# Patient Record
Sex: MALE | Race: WHITE | Employment: OTHER | URBAN - METROPOLITAN AREA
[De-identification: names, ages, dates, MRNs, and addresses within clinical notes are randomized per-mention and may not be internally consistent; named-entity substitution may affect disease eponyms.]

---

## 2020-09-10 PROCEDURE — 99285 EMERGENCY DEPT VISIT HI MDM: CPT

## 2020-09-10 PROCEDURE — 96365 THER/PROPH/DIAG IV INF INIT: CPT

## 2020-09-10 ASSESSMENT — PAIN SCALES - GENERAL: PAINLEVEL_OUTOF10: 8

## 2020-09-10 ASSESSMENT — PAIN DESCRIPTION - FREQUENCY: FREQUENCY: INTERMITTENT

## 2020-09-11 ENCOUNTER — APPOINTMENT (OUTPATIENT)
Dept: GENERAL RADIOLOGY | Age: 35
DRG: 854 | End: 2020-09-11

## 2020-09-11 ENCOUNTER — HOSPITAL ENCOUNTER (INPATIENT)
Age: 35
LOS: 2 days | Discharge: HOME | DRG: 854 | End: 2020-09-13
Attending: EMERGENCY MEDICINE | Admitting: INTERNAL MEDICINE

## 2020-09-11 PROBLEM — Z72.0 TOBACCO ABUSE: Status: ACTIVE | Noted: 2020-09-11

## 2020-09-11 PROBLEM — F11.10 HEROIN ABUSE (HCC): Status: ACTIVE | Noted: 2020-09-11

## 2020-09-11 PROBLEM — L03.90 CELLULITIS: Status: ACTIVE | Noted: 2020-09-11

## 2020-09-11 LAB
ALBUMIN SERPL-MCNC: 3.7 G/DL (ref 3.5–5.2)
ALP BLD-CCNC: 119 U/L (ref 40–129)
ALT SERPL-CCNC: 23 U/L (ref 0–40)
ANION GAP SERPL CALCULATED.3IONS-SCNC: 9 MMOL/L (ref 7–16)
AST SERPL-CCNC: 22 U/L (ref 0–39)
BASOPHILS ABSOLUTE: 0.06 E9/L (ref 0–0.2)
BASOPHILS RELATIVE PERCENT: 0.4 % (ref 0–2)
BILIRUB SERPL-MCNC: 0.4 MG/DL (ref 0–1.2)
BILIRUBIN URINE: NEGATIVE
BLOOD, URINE: NEGATIVE
BUN BLDV-MCNC: 8 MG/DL (ref 6–20)
CALCIUM SERPL-MCNC: 9.2 MG/DL (ref 8.6–10.2)
CHLORIDE BLD-SCNC: 97 MMOL/L (ref 98–107)
CLARITY: CLEAR
CO2: 27 MMOL/L (ref 22–29)
COLOR: YELLOW
CREAT SERPL-MCNC: 0.8 MG/DL (ref 0.7–1.2)
EKG ATRIAL RATE: 94 BPM
EKG P AXIS: 68 DEGREES
EKG P-R INTERVAL: 132 MS
EKG Q-T INTERVAL: 354 MS
EKG QRS DURATION: 96 MS
EKG QTC CALCULATION (BAZETT): 442 MS
EKG R AXIS: 91 DEGREES
EKG T AXIS: 60 DEGREES
EKG VENTRICULAR RATE: 94 BPM
EOSINOPHILS ABSOLUTE: 0.01 E9/L (ref 0.05–0.5)
EOSINOPHILS RELATIVE PERCENT: 0.1 % (ref 0–6)
GFR AFRICAN AMERICAN: >60
GFR NON-AFRICAN AMERICAN: >60 ML/MIN/1.73
GLUCOSE BLD-MCNC: 114 MG/DL (ref 74–99)
GLUCOSE URINE: NEGATIVE MG/DL
HCT VFR BLD CALC: 33.8 % (ref 37–54)
HEMOGLOBIN: 10.9 G/DL (ref 12.5–16.5)
HIV-1 AND HIV-2 ANTIBODIES: NORMAL
IMMATURE GRANULOCYTES #: 0.28 E9/L
IMMATURE GRANULOCYTES %: 1.8 % (ref 0–5)
KETONES, URINE: NEGATIVE MG/DL
LACTIC ACID, SEPSIS: 1.2 MMOL/L (ref 0.5–1.9)
LEUKOCYTE ESTERASE, URINE: NEGATIVE
LYMPHOCYTES ABSOLUTE: 1.03 E9/L (ref 1.5–4)
LYMPHOCYTES RELATIVE PERCENT: 6.6 % (ref 20–42)
MCH RBC QN AUTO: 24.9 PG (ref 26–35)
MCHC RBC AUTO-ENTMCNC: 32.2 % (ref 32–34.5)
MCV RBC AUTO: 77.3 FL (ref 80–99.9)
MONOCYTES ABSOLUTE: 1.1 E9/L (ref 0.1–0.95)
MONOCYTES RELATIVE PERCENT: 7 % (ref 2–12)
NEUTROPHILS ABSOLUTE: 13.21 E9/L (ref 1.8–7.3)
NEUTROPHILS RELATIVE PERCENT: 84.1 % (ref 43–80)
NITRITE, URINE: NEGATIVE
PDW BLD-RTO: 14.7 FL (ref 11.5–15)
PH UA: 5.5 (ref 5–9)
PLATELET # BLD: 412 E9/L (ref 130–450)
PMV BLD AUTO: 9.7 FL (ref 7–12)
POTASSIUM SERPL-SCNC: 3.3 MMOL/L (ref 3.5–5)
PRO-BNP: 415 PG/ML (ref 0–125)
PROTEIN UA: NEGATIVE MG/DL
RBC # BLD: 4.37 E12/L (ref 3.8–5.8)
SARS-COV-2, NAAT: NOT DETECTED
SODIUM BLD-SCNC: 133 MMOL/L (ref 132–146)
SPECIFIC GRAVITY UA: 1.02 (ref 1–1.03)
TOTAL CK: 61 U/L (ref 20–200)
TOTAL PROTEIN: 7.1 G/DL (ref 6.4–8.3)
TROPONIN: <0.01 NG/ML (ref 0–0.03)
UROBILINOGEN, URINE: 0.2 E.U./DL
WBC # BLD: 15.7 E9/L (ref 4.5–11.5)

## 2020-09-11 PROCEDURE — 85025 COMPLETE CBC W/AUTO DIFF WBC: CPT

## 2020-09-11 PROCEDURE — 93010 ELECTROCARDIOGRAM REPORT: CPT | Performed by: INTERNAL MEDICINE

## 2020-09-11 PROCEDURE — 86703 HIV-1/HIV-2 1 RESULT ANTBDY: CPT

## 2020-09-11 PROCEDURE — 6370000000 HC RX 637 (ALT 250 FOR IP): Performed by: EMERGENCY MEDICINE

## 2020-09-11 PROCEDURE — 2580000003 HC RX 258: Performed by: INTERNAL MEDICINE

## 2020-09-11 PROCEDURE — 6360000002 HC RX W HCPCS: Performed by: INTERNAL MEDICINE

## 2020-09-11 PROCEDURE — 84484 ASSAY OF TROPONIN QUANT: CPT

## 2020-09-11 PROCEDURE — 2580000003 HC RX 258: Performed by: NURSE PRACTITIONER

## 2020-09-11 PROCEDURE — 71045 X-RAY EXAM CHEST 1 VIEW: CPT

## 2020-09-11 PROCEDURE — U0002 COVID-19 LAB TEST NON-CDC: HCPCS

## 2020-09-11 PROCEDURE — 1200000000 HC SEMI PRIVATE

## 2020-09-11 PROCEDURE — 83880 ASSAY OF NATRIURETIC PEPTIDE: CPT

## 2020-09-11 PROCEDURE — 6370000000 HC RX 637 (ALT 250 FOR IP): Performed by: NURSE PRACTITIONER

## 2020-09-11 PROCEDURE — 6370000000 HC RX 637 (ALT 250 FOR IP): Performed by: INTERNAL MEDICINE

## 2020-09-11 PROCEDURE — 83605 ASSAY OF LACTIC ACID: CPT

## 2020-09-11 PROCEDURE — 87070 CULTURE OTHR SPECIMN AEROBIC: CPT

## 2020-09-11 PROCEDURE — 80053 COMPREHEN METABOLIC PANEL: CPT

## 2020-09-11 PROCEDURE — 36415 COLL VENOUS BLD VENIPUNCTURE: CPT

## 2020-09-11 PROCEDURE — 6360000002 HC RX W HCPCS: Performed by: NURSE PRACTITIONER

## 2020-09-11 PROCEDURE — 73590 X-RAY EXAM OF LOWER LEG: CPT

## 2020-09-11 PROCEDURE — 82550 ASSAY OF CK (CPK): CPT

## 2020-09-11 PROCEDURE — 87088 URINE BACTERIA CULTURE: CPT

## 2020-09-11 PROCEDURE — 99223 1ST HOSP IP/OBS HIGH 75: CPT | Performed by: INTERNAL MEDICINE

## 2020-09-11 PROCEDURE — 87040 BLOOD CULTURE FOR BACTERIA: CPT

## 2020-09-11 PROCEDURE — 81003 URINALYSIS AUTO W/O SCOPE: CPT

## 2020-09-11 PROCEDURE — 6360000002 HC RX W HCPCS: Performed by: EMERGENCY MEDICINE

## 2020-09-11 PROCEDURE — 93005 ELECTROCARDIOGRAM TRACING: CPT | Performed by: NURSE PRACTITIONER

## 2020-09-11 RX ORDER — SODIUM CHLORIDE 0.9 % (FLUSH) 0.9 %
10 SYRINGE (ML) INJECTION EVERY 12 HOURS SCHEDULED
Status: DISCONTINUED | OUTPATIENT
Start: 2020-09-11 | End: 2020-09-14 | Stop reason: HOSPADM

## 2020-09-11 RX ORDER — ACETAMINOPHEN 500 MG
1000 TABLET ORAL ONCE
Status: COMPLETED | OUTPATIENT
Start: 2020-09-11 | End: 2020-09-11

## 2020-09-11 RX ORDER — PROMETHAZINE HYDROCHLORIDE 25 MG/1
12.5 TABLET ORAL EVERY 6 HOURS PRN
Status: DISCONTINUED | OUTPATIENT
Start: 2020-09-11 | End: 2020-09-14 | Stop reason: HOSPADM

## 2020-09-11 RX ORDER — POLYETHYLENE GLYCOL 3350 17 G
2 POWDER IN PACKET (EA) ORAL
Status: DISCONTINUED | OUTPATIENT
Start: 2020-09-11 | End: 2020-09-14 | Stop reason: HOSPADM

## 2020-09-11 RX ORDER — OXYCODONE HYDROCHLORIDE 5 MG/1
5 TABLET ORAL EVERY 4 HOURS PRN
Status: DISCONTINUED | OUTPATIENT
Start: 2020-09-11 | End: 2020-09-11

## 2020-09-11 RX ORDER — SODIUM CHLORIDE 9 MG/ML
INJECTION, SOLUTION INTRAVENOUS CONTINUOUS
Status: DISCONTINUED | OUTPATIENT
Start: 2020-09-11 | End: 2020-09-14 | Stop reason: HOSPADM

## 2020-09-11 RX ORDER — OXYCODONE HYDROCHLORIDE 5 MG/1
5 TABLET ORAL EVERY 4 HOURS PRN
Status: DISCONTINUED | OUTPATIENT
Start: 2020-09-11 | End: 2020-09-14 | Stop reason: HOSPADM

## 2020-09-11 RX ORDER — ACETAMINOPHEN 325 MG/1
650 TABLET ORAL EVERY 6 HOURS PRN
Status: DISCONTINUED | OUTPATIENT
Start: 2020-09-11 | End: 2020-09-14 | Stop reason: HOSPADM

## 2020-09-11 RX ORDER — SODIUM CHLORIDE 0.9 % (FLUSH) 0.9 %
10 SYRINGE (ML) INJECTION PRN
Status: DISCONTINUED | OUTPATIENT
Start: 2020-09-11 | End: 2020-09-14 | Stop reason: HOSPADM

## 2020-09-11 RX ORDER — KETOROLAC TROMETHAMINE 30 MG/ML
30 INJECTION, SOLUTION INTRAMUSCULAR; INTRAVENOUS EVERY 6 HOURS PRN
Status: DISCONTINUED | OUTPATIENT
Start: 2020-09-11 | End: 2020-09-14 | Stop reason: HOSPADM

## 2020-09-11 RX ORDER — POTASSIUM CHLORIDE 20 MEQ/1
40 TABLET, EXTENDED RELEASE ORAL ONCE
Status: COMPLETED | OUTPATIENT
Start: 2020-09-11 | End: 2020-09-11

## 2020-09-11 RX ORDER — 0.9 % SODIUM CHLORIDE 0.9 %
2100 INTRAVENOUS SOLUTION INTRAVENOUS ONCE
Status: COMPLETED | OUTPATIENT
Start: 2020-09-11 | End: 2020-09-11

## 2020-09-11 RX ORDER — ONDANSETRON 2 MG/ML
4 INJECTION INTRAMUSCULAR; INTRAVENOUS EVERY 6 HOURS PRN
Status: DISCONTINUED | OUTPATIENT
Start: 2020-09-11 | End: 2020-09-14 | Stop reason: HOSPADM

## 2020-09-11 RX ORDER — KETOROLAC TROMETHAMINE 30 MG/ML
15 INJECTION, SOLUTION INTRAMUSCULAR; INTRAVENOUS ONCE
Status: COMPLETED | OUTPATIENT
Start: 2020-09-11 | End: 2020-09-11

## 2020-09-11 RX ORDER — POLYETHYLENE GLYCOL 3350 17 G/17G
17 POWDER, FOR SOLUTION ORAL DAILY PRN
Status: DISCONTINUED | OUTPATIENT
Start: 2020-09-11 | End: 2020-09-14 | Stop reason: HOSPADM

## 2020-09-11 RX ORDER — ACETAMINOPHEN 650 MG/1
650 SUPPOSITORY RECTAL EVERY 6 HOURS PRN
Status: DISCONTINUED | OUTPATIENT
Start: 2020-09-11 | End: 2020-09-14 | Stop reason: HOSPADM

## 2020-09-11 RX ADMIN — Medication 10 ML: at 14:52

## 2020-09-11 RX ADMIN — ACETAMINOPHEN 650 MG: 325 TABLET ORAL at 09:57

## 2020-09-11 RX ADMIN — VANCOMYCIN HYDROCHLORIDE 1000 MG: 1 INJECTION, POWDER, LYOPHILIZED, FOR SOLUTION INTRAVENOUS at 03:45

## 2020-09-11 RX ADMIN — SODIUM CHLORIDE 75 ML/HR: 9 INJECTION, SOLUTION INTRAVENOUS at 15:27

## 2020-09-11 RX ADMIN — ACETAMINOPHEN 1000 MG: 500 TABLET ORAL at 00:58

## 2020-09-11 RX ADMIN — CEFEPIME 2 G: 2 INJECTION, POWDER, FOR SOLUTION INTRAMUSCULAR; INTRAVENOUS at 22:04

## 2020-09-11 RX ADMIN — CEFEPIME 2 G: 2 INJECTION, POWDER, FOR SOLUTION INTRAMUSCULAR; INTRAVENOUS at 15:28

## 2020-09-11 RX ADMIN — ACETAMINOPHEN 650 MG: 325 TABLET ORAL at 16:08

## 2020-09-11 RX ADMIN — VANCOMYCIN HYDROCHLORIDE 1000 MG: 1 INJECTION, POWDER, LYOPHILIZED, FOR SOLUTION INTRAVENOUS at 12:42

## 2020-09-11 RX ADMIN — ACETAMINOPHEN 650 MG: 325 TABLET ORAL at 22:04

## 2020-09-11 RX ADMIN — OXYCODONE HYDROCHLORIDE 5 MG: 5 TABLET ORAL at 22:05

## 2020-09-11 RX ADMIN — KETOROLAC TROMETHAMINE 15 MG: 30 INJECTION, SOLUTION INTRAMUSCULAR at 03:44

## 2020-09-11 RX ADMIN — SODIUM CHLORIDE: 9 INJECTION, SOLUTION INTRAVENOUS at 05:46

## 2020-09-11 RX ADMIN — SODIUM CHLORIDE, PRESERVATIVE FREE 10 ML: 5 INJECTION INTRAVENOUS at 20:09

## 2020-09-11 RX ADMIN — POTASSIUM CHLORIDE 40 MEQ: 20 TABLET, EXTENDED RELEASE ORAL at 03:44

## 2020-09-11 RX ADMIN — VANCOMYCIN HYDROCHLORIDE 1000 MG: 1 INJECTION, POWDER, LYOPHILIZED, FOR SOLUTION INTRAVENOUS at 20:09

## 2020-09-11 RX ADMIN — OXYCODONE HYDROCHLORIDE 5 MG: 5 TABLET ORAL at 09:57

## 2020-09-11 RX ADMIN — PIPERACILLIN SODIUM AND TAZOBACTAM SODIUM 4.5 G: 4; .5 INJECTION, POWDER, LYOPHILIZED, FOR SOLUTION INTRAVENOUS at 01:40

## 2020-09-11 RX ADMIN — SODIUM CHLORIDE 2100 ML: 9 INJECTION, SOLUTION INTRAVENOUS at 01:39

## 2020-09-11 RX ADMIN — OXYCODONE HYDROCHLORIDE 5 MG: 5 TABLET ORAL at 15:38

## 2020-09-11 ASSESSMENT — PAIN SCALES - GENERAL
PAINLEVEL_OUTOF10: 9
PAINLEVEL_OUTOF10: 0
PAINLEVEL_OUTOF10: 6
PAINLEVEL_OUTOF10: 0
PAINLEVEL_OUTOF10: 7
PAINLEVEL_OUTOF10: 0
PAINLEVEL_OUTOF10: 8
PAINLEVEL_OUTOF10: 8
PAINLEVEL_OUTOF10: 7
PAINLEVEL_OUTOF10: 9
PAINLEVEL_OUTOF10: 9

## 2020-09-11 ASSESSMENT — PAIN DESCRIPTION - ONSET
ONSET: ON-GOING
ONSET: ON-GOING

## 2020-09-11 ASSESSMENT — PAIN DESCRIPTION - DESCRIPTORS
DESCRIPTORS: ACHING;DISCOMFORT;CONSTANT
DESCRIPTORS: ACHING;DISCOMFORT;CONSTANT
DESCRIPTORS: BURNING;DISCOMFORT;DULL

## 2020-09-11 ASSESSMENT — PAIN DESCRIPTION - LOCATION
LOCATION: GENERALIZED
LOCATION: GENERALIZED

## 2020-09-11 ASSESSMENT — PAIN DESCRIPTION - FREQUENCY
FREQUENCY: CONTINUOUS

## 2020-09-11 ASSESSMENT — PAIN DESCRIPTION - PROGRESSION
CLINICAL_PROGRESSION: NOT CHANGED

## 2020-09-11 ASSESSMENT — PAIN DESCRIPTION - PAIN TYPE
TYPE: ACUTE PAIN

## 2020-09-11 ASSESSMENT — PAIN DESCRIPTION - ORIENTATION: ORIENTATION: RIGHT

## 2020-09-11 ASSESSMENT — PAIN - FUNCTIONAL ASSESSMENT
PAIN_FUNCTIONAL_ASSESSMENT: PREVENTS OR INTERFERES SOME ACTIVE ACTIVITIES AND ADLS
PAIN_FUNCTIONAL_ASSESSMENT: PREVENTS OR INTERFERES SOME ACTIVE ACTIVITIES AND ADLS

## 2020-09-11 NOTE — PROGRESS NOTES
Pharmacy Consultation Note  (Antibiotic Dosing and Monitoring)    Initial consult date: 2020  Consulting physician: Dr. Glory Tran  Drug: Vancomycin  Indication: Cellulitis    Age/Gender Height Weight IBW Dosing weight  Allergy Information   35 y.o./male 6' 1\" (185.4 cm) 150 lb (68 kg)     Ideal body weight: 79.9 kg (176 lb 2.4 oz)  150 lb (68 kg)  Patient has no known allergies. Temp (24hrs), Av °F (38.3 °C), Min:98.7 °F (37.1 °C), Max:103.1 °F (39.5 °C)          Date  WBC BUN/SCr Drug/Dose Time   Given Level(s)   (Time) Comments    15.7 8/0.8 Vancomycin 1 g IV Q8H  0345  1242                                        Intake/Output Summary (Last 24 hours) at 2020 1313  Last data filed at 2020 0826  Gross per 24 hour   Intake 2200 ml   Output --   Net 2200 ml       Historical Cultures:  No results found for: ORG  No results for input(s): BC in the last 72 hours.     Cultures:  available culture and sensitivity results were reviewed in EPIC   blood cx - pending   urine cx - pending   wound cx - pending    Assessment:  · Patient is a 28year old male currently ordered vancomycin and cefepime for cellulitis  · Estimated CrCl = >120 mL/min  · Goal trough level = 15-20 mcg/mL; goal AUC/LIZZETTE >400    Plan:  · Will initiate vancomycin at a dose of 1000 mg IV every 8 hours  · Will check trough level at steady state if vancomycin continues  · Clinical pharmacy to follow    Trenton Vallejo, PharmD, BCCCP  2020  1:17 PM

## 2020-09-11 NOTE — CARE COORDINATION
Social Work / Discharge Planning : SW and CM met with patient and explained role as discharge planner/ transition of care., Patient amditted with Cellulitis. Patient is IV heroin drug user. Patient does NOT have insurance nor PCP. SW added pre-service information to avs.HHC is NOT an option at discharge due to IV drug user, no PCP and No insurance. Patient very forthcoming about his 8 years of shooting up Heroin. Patient in agreement for HELP with his addiction. Referral called to Shay at [facility] and she will see patient. Patient best bet at discharge is [facility] for IV antibiotics IF needed and they have a drug program. Once patient gets medically stable, he will need intense in patient rehab to deal with his long years of addiction. Patient does have family support from both parents. Await screening from [facility] financial department so patient can apply for insurance. AWait ID plan. SW to follow.  Electronically signed by BUSHRA Fuller on 9/11/20 at 12:32 PM EDT

## 2020-09-11 NOTE — FLOWSHEET NOTE
Initial Inpatient Wound Care    Admit Date: 9/11/2020 12:27 AM    Reason for consult:     wounds generalized large wound on thigh        Significant history:  IV heroin use  Adm with fever and leg pain    Wound history: POA    Findings: awake and verbally appropriate    09/11/20 1040   Wound 09/11/20 Thigh Anterior;Right   Date First Assessed/Time First Assessed: 09/11/20 1040   Present on Hospital Admission: Yes  Location: Thigh  Wound Location Orientation: Anterior;Right   Wound Image    Dressing Changed Changed/New   Dressing/Treatment   (adaptic, opticell. stratasorb)   Dressing Change Due 09/12/20   Wound Length (cm) 6 cm   Wound Width (cm) 4 cm   Wound Depth (cm)   (obs)   Wound Surface Area (cm^2) 24 cm^2   Wound Assessment Black  (purple, mushy)   Drainage Amount Small   Drainage Description   (red)   Odor Strong   Ramila-wound Assessment Induration; Red   bilateral legs with multiple scarring, track marks, medial left ankle. Dried skin approx 3x2 each. Not open  Hands and fingers edematous, small scabs, multiple scarring          Plan: ID to see  Dressing changes, continue  Morris Sommers.  STEPHIE Kaur [place] 9/11/2020 10:43 AM

## 2020-09-11 NOTE — ED PROVIDER NOTES
ED Attending  CC: Yes       Department of Emergency Medicine   ED  Provider Note  Admit Date/RoomTime: 9/11/2020 12:27 AM  ED Room: 25/25  MRN: [id number]  Chief Complaint:   Nausea; Chills; and Leg Swelling       History of Present Illness   Source of history provided by:  patient and parent. History/Exam Limitations: none. Kisha Watts is a 28 y.o. male who has a past medical history of: No past medical history on file. presents to the ED by private vehicle for fever, chills, nausea, and edema of all extremities, beginning 3 days ago and are gradually worsening since that time. The complaint has been persistent, moderate in severity. Patient states he uses IV heroin regularly. His right lower extremity is warm and red. States he is having occasional nonproductive cough. States nothing makes his symptoms better or worse. He denies headache, sore throat, chest pain, shortness of breath, abdominal pain, vomiting, diarrhea, dysuria. ROS   Pertinent positives and negatives are stated within HPI, all other systems reviewed and are negative. No past surgical history on file. Social History:  reports that he has been smoking. He has been smoking about 1.00 pack per day. He has never used smokeless tobacco. He reports current drug use. Family History: family history is not on file. Allergies: Patient has no known allergies. Physical Exam Section   Oxygen Saturation Interpretation: Normal.   ED Triage Vitals [09/10/20 2334]   BP Temp Temp Source Pulse Resp SpO2 Height Weight   (!) 118/56 103.1 °F (39.5 °C) Infrared 110 16 91 % 6' 1\" (1.854 m) 150 lb (68 kg)       Physical Exam  · Constitutional/General: Alert and oriented x3, shivering and ill-appearing. · HEENT:  NC/NT. PERRLA,  Airway patent. · Neck: Supple, full ROM, non tender to palpation in the midline, no stridor, no crepitus, no meningeal signs  · Respiratory: Lungs clear to auscultation bilaterally, no wheezes, rales, or rhonchi.  Not in respiratory distress  · CV: Tachycardic rate. Regular rhythm. No murmurs, gallops, or rubs. 2+ distal pulses  · Chest: No chest wall tenderness  · GI:  Abdomen Soft, Non tender, Non distended. +BS. No rebound, guarding, or rigidity. No pulsatile masses. · Musculoskeletal: Moves all extremities x 4. Warm and well perfused, no clubbing, cyanosis. Capillary refill <3 seconds. 1+ nonpitting edema to both hands and feet. 1+ nonpitting edema to the right lower extremity with mild erythema. Tenderness more significant in the anterior right lower extremity than the calf. · Integument: skin warm and dry. No rashes. Generalized scattered healed injection site scars.   · Lymphatic: no lymphadenopathy noted  · Neurologic: GCS 15, no focal deficits, symmetric strength 5/5 in the upper and lower extremities bilaterally  · Psychiatric: Normal Affect      Lab / Imaging Results   (All laboratory and radiology results have been personally reviewed by myself)  Labs:  Results for orders placed or performed during the hospital encounter of 09/11/20   Lactate, Sepsis   Result Value Ref Range    Lactic Acid, Sepsis 1.2 0.5 - 1.9 mmol/L   CBC Auto Differential   Result Value Ref Range    WBC 15.7 (H) 4.5 - 11.5 E9/L    RBC 4.37 3.80 - 5.80 E12/L    Hemoglobin 10.9 (L) 12.5 - 16.5 g/dL    Hematocrit 33.8 (L) 37.0 - 54.0 %    MCV 77.3 (L) 80.0 - 99.9 fL    MCH 24.9 (L) 26.0 - 35.0 pg    MCHC 32.2 32.0 - 34.5 %    RDW 14.7 11.5 - 15.0 fL    Platelets 788 642 - 914 E9/L    MPV 9.7 7.0 - 12.0 fL    Neutrophils % 84.1 (H) 43.0 - 80.0 %    Immature Granulocytes % 1.8 0.0 - 5.0 %    Lymphocytes % 6.6 (L) 20.0 - 42.0 %    Monocytes % 7.0 2.0 - 12.0 %    Eosinophils % 0.1 0.0 - 6.0 %    Basophils % 0.4 0.0 - 2.0 %    Neutrophils Absolute 13.21 (H) 1.80 - 7.30 E9/L    Immature Granulocytes # 0.28 E9/L    Lymphocytes Absolute 1.03 (L) 1.50 - 4.00 E9/L    Monocytes Absolute 1.10 (H) 0.10 - 0.95 E9/L    Eosinophils Absolute 0.01 (L) 0.05 - be related to soft tissue cellulitis. Negative for    soft tissue gas. 2. Negative for acute fracture, dislocation. This report has been electronically signed by Casi Smith MD.      XR CHEST PORTABLE   Final Result      No airspace opacities or pleural effusion. EKG #1:  Interpreted by emergency department physician unless otherwise noted. Time:  0248    Rate: 94  Rhythm: Sinus. Interpretation: normal sinus rhythm, incomplete right bundle branch block, there are no previous tracings available for comparison. ED Course / Medical Decision Making     Medications   sodium chloride flush 0.9 % injection 10 mL (has no administration in time range)   sodium chloride flush 0.9 % injection 10 mL (has no administration in time range)   vancomycin 1000 mg IVPB in 250 mL D5W addavial (has no administration in time range)   potassium chloride (KLOR-CON M) extended release tablet 40 mEq (has no administration in time range)   ketorolac (TORADOL) injection 15 mg (has no administration in time range)   acetaminophen (TYLENOL) tablet 1,000 mg (1,000 mg Oral Given 9/11/20 0058)   piperacillin-tazobactam (ZOSYN) 4.5 g in dextrose 5 % 100 mL IVPB (mini-bag) (0 g Intravenous Stopped 9/11/20 0215)   0.9 % sodium chloride bolus (2,100 mLs Intravenous New Bag 9/11/20 0139)        Re-Evaluations:  9/11/20     0200: Patient resting in no distress. Time: 0300    Patients symptoms are unchanged. No distress. Consultations:             61 51 81: Dr. Janelle Lozano spoke with Dr. Caleb Stevens and the patient will be admitted to a monitored bed for further treatment and observation. Procedures:   none    MDM: Patient presented with admitted IV drug use, edema of his limbs and clinical appearance of cellulitis to the right lower extremity. He meets sepsis criteria. IV antibiotics and cultures were obtained. He remained stable while in emergency department.   Internal medicine was consulted and the patient will be admitted to a monitored bed. Counseling:   I have spoken with the patient and discussed todays results, in addition to providing specific details for the plan of care and counseling regarding the diagnosis and prognosis and are agreeable with the plan. Assessment      1. Cellulitis of right leg New Problem   2. Septicemia ([address] New Problem     This patient's ED course included: a personal history and physicial examination  This patient has remained hemodynamically stable during their ED course. Plan   Admit to telemetry. Patient condition is stable. New Medications     New Prescriptions    No medications on file     Electronically signed by AL Coe CNP   DD: 9/11/20  **This report was transcribed using voice recognition software. Every effort was made to ensure accuracy; however, inadvertent computerized transcription errors may be present. END OF PROVIDER NOTE    CRITICAL CARE:   45 MINUTES. Please note that the withdrawal or failure to initiate urgent interventions for this patient would likely result in a life threatening deterioration or permanent disability. Accordingly this patient received the above mentioned time, excluding separately billable procedures.               AL Napier CNP  09/11/20 0308

## 2020-09-11 NOTE — PROGRESS NOTES
[facility] Quality Flow/Interdisciplinary Rounds Progress Note        Quality Flow Rounds held on September 11, 2020    Disciplines Attending:  Bedside Nurse, ,  and Nursing Unit Leadership    James Lovett was admitted on 9/11/2020 12:27 AM    Anticipated Discharge Date:  Expected Discharge Date: 09/14/20    Disposition:    Jacob Score:  Jacob Scale Score: 19    Readmission Risk              Risk of Unplanned Readmission:        9           Discussed patient goal for the day, patient clinical progression, and barriers to discharge. The following Goal(s) of the Day/Commitment(s) have been identified:  IV antibiotics.        Edwina Livingston  September 11, 2020

## 2020-09-11 NOTE — PROGRESS NOTES
Pharmacy Consultation Note  (Antibiotic Dosing and Monitoring)    Initial consult date: 2020  Consulting physician: Jerod Lindsey  Drug: Vancomycin  Indication: Cellulitis    Age/  Gender Height Weight IBW Dosing weight  Allergy Information   35 y.o./male 6' 1\" (185.4 cm) 150 lb (68 kg)     Ideal body weight: 79.9 kg (176 lb 2.4 oz)  150 lb (68 kg)  Patient has no known allergies. Temp (24hrs), Av.2 °F (38.4 °C), Min:98.7 °F (37.1 °C), Max:103.1 °F (39.5 °C)          Date  WBC BUN SCr CrCl  (mL/min) Drug/Dose Time   Given Level(s)   (Time) Comments   2020 15.7 8 0.8  124   Vancomycin 1000 mg IV  0345                                            Intake/Output Summary (Last 24 hours) at 2020 0526  Last data filed at 2020 0348  Gross per 24 hour   Intake 2200 ml   Output --   Net 2200 ml       Historical Cultures:  No results found for: ORG  No results for input(s): BC in the last 72 hours. Cultures:  available culture and sensitivity results were reviewed in [facility]    Assessment:  · 28 y.o. male has been initiated Vancomycin.   · Estimated CrCl = 124 mL/min  · Goal trough level = 15-20 mcg/mL    Plan:  · Will initiate vancomycin at a dose of 1000 mg IV every 8 hours  · Monitor renal function   · Clinical pharmacy to follow      Marissa Bain [address] [place] [address] 2020 5:26 AM

## 2020-09-11 NOTE — PLAN OF CARE
Patient sitting up in bed eating breakfast tray. Pain better control. Patient stated he is feeling better.

## 2020-09-11 NOTE — H&P
[facility] History and Physical    Perpetual assessment: 68-year-old male with past medical history of IV drug abuse presents with fevers and right lower leg pain. Right leg cellulitis  -Secondary from continues heroin use  -COVID-19 was negative  -X-ray was negative for any acute fractures or soft tissue gas  -We will continue IV vancomycin started in ED  -Await blood cultures results rule out endocarditis  -Of note did not have any murmurs  -Monitor white blood cell count for response    Heroin abuse  -Did you yesterday  -We will provide oral oxycodone to assist him most likely withdrawal  -The patient is requesting HIV test we will proceed    Tobacco abuse  -Encourage smoking cessation    CODE STATUS: Full  DVT prophylaxis: Lovenox      CHIEF COMPLAINT: Fevers and right leg pain    History of Present Illness: This is an unfortunate 68-year-old male who presents to [facility] the above-stated complaint. All history has been taken for the patient and review of medical records. He has been using heroin regularly for quite some years. Over the past few days has been having fevers and worsening right leg swelling. He has also bilateral right hand swelling which is been chronic for him. Due to right leg swelling for which he came to ED for evaluation. There he was found to have a white count of 15,700. Concern for systemic infection was raised. X-rays show signs of cellulitis but no gas. Blood cultures were obtained. Due to his history of IV drug abuse he was admitted for IV antibiotics. Informant(s) for H&P:    REVIEW OF SYSTEMS:  A comprehensive review of systems was negative except for: what is in the HPI      PMH:  Past Medical History:   Diagnosis Date    Heroin use        Surgical History:  History reviewed. No pertinent surgical history. Medications Prior to Admission:    Prior to Admission medications    Medication Sig Start Date End Date Taking? Authorizing Provider   albuterol (PROAIR HFA) 108 (90 BASE) MCG/ACT inhaler Inhale 2 puffs into the lungs every 6 hours as needed for Wheezing 4/27/15 5/4/15  Isabela Spears MD       Allergies:    Patient has no known allergies. Social History:    reports that he has been smoking. He has been smoking about 1.00 pack per day. He has never used smokeless tobacco. He reports current drug use. Family History:   family history is not on file. PHYSICAL EXAM:  Vitals:  BP (!) 100/44   Pulse 93   Temp 98.7 °F (37.1 °C) (Oral)   Resp 16   Ht 6' 1\" (1.854 m)   Wt 150 lb (68 kg)   SpO2 95%   BMI 19.79 kg/m²     General Appearance: alert and oriented to person, place and time and in no acute distress  Skin: Multiple excoriations throughout his entire body  Head: normocephalic and atraumatic  Eyes: pupils equal, round, and reactive to light, extraocular eye movements intact, conjunctivae normal  Neck: neck supple and non tender without mass   Pulmonary/Chest: clear to auscultation bilaterally- no wheezes, rales or rhonchi, normal air movement, no respiratory distress  Cardiovascular: normal rate, normal S1 and S2 and no carotid bruits  Abdomen: soft, non-tender, non-distended, normal bowel sounds, no masses or organomegaly  Extremities: no cyanosis, no clubbing and no edema  Neurologic: no cranial nerve deficit and speech normal        LABS:  Recent Labs     09/11/20  0135      K 3.3*   CL 97*   CO2 27   BUN 8   CREATININE 0.8   GLUCOSE 114*   CALCIUM 9.2       Recent Labs     09/11/20  0135   WBC 15.7*   RBC 4.37   HGB 10.9*   HCT 33.8*   MCV 77.3*   MCH 24.9*   MCHC 32.2   RDW 14.7      MPV 9.7       No results for input(s): POCGLU in the last 72 hours. Radiology:   XR TIBIA FIBULA RIGHT (2 VIEWS)   Final Result   1. Subcutaneous edema left calf region. Soft tissue swelling surrounds the    ankle joint. Findings may be related to soft tissue cellulitis. Negative for    soft tissue gas. 2. Negative for acute fracture, dislocation. This report has been electronically signed by Celia Cruz MD.      XR CHEST PORTABLE   Final Result      No airspace opacities or pleural effusion. NOTE: This report was transcribed using voice recognition software. Every effort was made to ensure accuracy; however, inadvertent computerized transcription errors may be present.   Electronically signed by Shaunna Canales MD on 9/11/2020 at 3:59 AM

## 2020-09-11 NOTE — CONSULTS
[address] [place] [address] [facility]    Consultation Note     Admit Date: 9/11/2020 12:27 AM    Reason for Consult:   sepsis    Attending Physician:  Charlene Koehler DO     Chief Complaint: Cellulitis right leg    HISTORY OF PRESENT ILLNESS:     The patient is a 28 y.o.  male not previously known to the Infectious Diseases service. The patient is young homosexual male with a history of IV drug abuse. He has a history of scarring of both hands from IV drug use. 2 weeks ago he attempted to inject in his right thigh and missed and developed swelling and erythema the site. Site became increasingly swollen and tender and he developed pain and tenderness throughout his entire right leg. He subsequently began to develop chills fever and nausea. He presented to the emergency room for evaluation. He was noted to have an ulcerated lesion on his right anterior thigh. He also was found to have a fever 103.1 and white blood cell count of 15.7. Blood cultures were obtained as well as wound culture and he was placed on empiric antibiotic therapy. Currently he states that the right leg is less painful and less erythematous compared to admission. Past Medical History:          Diagnosis Date    Heroin use      Past Surgical History:      History reviewed. No pertinent surgical history. Current Medications:     Scheduled Meds:   sodium chloride flush  10 mL Intravenous 2 times per day    sodium chloride flush  10 mL Intravenous 2 times per day    enoxaparin  40 mg Subcutaneous Daily    vancomycin  15 mg/kg Intravenous Q8H     Continuous Infusions:   sodium chloride 75 mL/hr at 09/11/20 0546     PRN Meds:sodium chloride flush, sodium chloride flush, acetaminophen **OR** acetaminophen, polyethylene glycol, promethazine **OR** ondansetron, nicotine polacrilex, ketorolac, oxyCODONE    Allergies:  Patient has no known allergies.     Social History:     Social History     Socioeconomic History    Marital status: Single     Spouse name: None    Number of children: None    Years of education: None    Highest education level: None   Occupational History    None   Social Needs    Financial resource strain: None    Food insecurity     Worry: None     Inability: None    Transportation needs     Medical: None     Non-medical: None   Tobacco Use    Smoking status: Current Every Day Smoker     Packs/day: 1.00    Smokeless tobacco: Never Used   Substance and Sexual Activity    Alcohol use: None    Drug use: Yes     Comment: heroin    Sexual activity: Yes     Partners: Male   Lifestyle    Physical activity     Days per week: None     Minutes per session: None    Stress: None   Relationships    Social connections     Talks on phone: None     Gets together: None     Attends [employer] service: None     Active member of club or organization: None     Attends meetings of clubs or organizations: None     Relationship status: None    Intimate partner violence     Fear of current or ex partner: None     Emotionally abused: None     Physically abused: None     Forced sexual activity: None   Other Topics Concern    None   Social History Narrative    None         Family History:     History reviewed. No pertinent family history. . Otherwise non-pertinent to the chief complaint. REVIEW OF SYSTEMS:    CONSTITUTIONAL: Fever and chills. No loss of weight. EYES:  No double vision or drainage from eyes, ears or throat. HEENT:  No neck stiffness. No dysphagia. No drainage from eyes, ears or throat  RESPIRATORY:  No cough, productive sputum or hemoptysis. CARDIOVASCULAR:  No chest pain, palpitations, orthopnea or dyspnea on exertion. GASTROINTESTINAL: Nausea, no vomiting, diarrhea or constipation or hematochezia   GENITOURINARY:  No frequency burning dysuria or hematuria. INTEGUMENT/BREAST: Scars both hands from heroin injection. Wound right leg-see HPI.     HEMATOLOGIC/LYMPHATIC:  No lymphadenopathy or blood 09/11/2020    ALKPHOS 119 09/11/2020    AST 22 09/11/2020    ALT 23 09/11/2020       No results found for: PROTIME, INR    No results found for: TSH    Lab Results   Component Value Date    COLORU Yellow 09/11/2020    PHUR 5.5 09/11/2020    CLARITYU Clear 09/11/2020    SPECGRAV 1.020 09/11/2020    LEUKOCYTESUR Negative 09/11/2020    UROBILINOGEN 0.2 09/11/2020    BILIRUBINUR Negative 09/11/2020    BLOODU Negative 09/11/2020    GLUCOSEU Negative 09/11/2020       No results found for: AFC4VET, BEART, W8PWFEDO, PHART, THGBART, SEM5UHQ, PO2ART, EKG7UGR  Radiology:  XR TIBIA FIBULA RIGHT (2 VIEWS)   Final Result   1. Subcutaneous edema left calf region. Soft tissue swelling surrounds the    ankle joint. Findings may be related to soft tissue cellulitis. Negative for    soft tissue gas. 2. Negative for acute fracture, dislocation. This report has been electronically signed by Efren Villalpando MD.      XR CHEST PORTABLE   Final Result      No airspace opacities or pleural effusion. Microbiology:  Pending  No results for input(s): BC in the last 72 hours. No results for input(s): ORG in the last 72 hours. No results for input(s): Tamanna Poet in the last 72 hours. No results for input(s): STREPNEUMAGU in the last 72 hours. No results for input(s): LP1UAG in the last 72 hours. No results for input(s): ASO in the last 72 hours. No results for input(s): CULTRESP in the last 72 hours. Problem list:    Principal Problem:    Cellulitis  Active Problems:    Heroin abuse ([facility] [address]    Tobacco abuse  Resolved Problems:    * No resolved hospital problems.  *      Assessment:    · Infected wound right thigh associated with heroin injection  · Sepsis secondary to wound infection  · Heroin abuse    Plan:      · Continue vancomycin  · Cefepime 2 g IV every 8 hours  · Check cultures  · Baseline ESR, CRP  · Recommend general surgical evaluation for possible debridement  · Monitor labs  · Will follow with you    Thank you for having us see this patient in consultation. I will be discussing this case with the treating physicians.       Electronically signed by Bartolo Bravo DO on 9/11/2020 at 12:38 PM

## 2020-09-12 ENCOUNTER — APPOINTMENT (OUTPATIENT)
Dept: ULTRASOUND IMAGING | Age: 35
DRG: 854 | End: 2020-09-12

## 2020-09-12 ENCOUNTER — APPOINTMENT (OUTPATIENT)
Dept: CT IMAGING | Age: 35
DRG: 854 | End: 2020-09-12

## 2020-09-12 ENCOUNTER — ANESTHESIA EVENT (OUTPATIENT)
Dept: OPERATING ROOM | Age: 35
DRG: 854 | End: 2020-09-12

## 2020-09-12 LAB
ANION GAP SERPL CALCULATED.3IONS-SCNC: 11 MMOL/L (ref 7–16)
BASOPHILS ABSOLUTE: 0.06 E9/L (ref 0–0.2)
BASOPHILS RELATIVE PERCENT: 0.4 % (ref 0–2)
BUN BLDV-MCNC: 11 MG/DL (ref 6–20)
C-REACTIVE PROTEIN: 27.1 MG/DL (ref 0–0.4)
CALCIUM SERPL-MCNC: 8.4 MG/DL (ref 8.6–10.2)
CHLORIDE BLD-SCNC: 98 MMOL/L (ref 98–107)
CO2: 24 MMOL/L (ref 22–29)
CREAT SERPL-MCNC: 0.8 MG/DL (ref 0.7–1.2)
EOSINOPHILS ABSOLUTE: 0.03 E9/L (ref 0.05–0.5)
EOSINOPHILS RELATIVE PERCENT: 0.2 % (ref 0–6)
GFR AFRICAN AMERICAN: >60
GFR NON-AFRICAN AMERICAN: >60 ML/MIN/1.73
GLUCOSE BLD-MCNC: 128 MG/DL (ref 74–99)
HCT VFR BLD CALC: 29.3 % (ref 37–54)
HEMOGLOBIN: 9.2 G/DL (ref 12.5–16.5)
IMMATURE GRANULOCYTES #: 0.27 E9/L
IMMATURE GRANULOCYTES %: 1.8 % (ref 0–5)
LYMPHOCYTES ABSOLUTE: 1.62 E9/L (ref 1.5–4)
LYMPHOCYTES RELATIVE PERCENT: 10.7 % (ref 20–42)
MCH RBC QN AUTO: 24.5 PG (ref 26–35)
MCHC RBC AUTO-ENTMCNC: 31.4 % (ref 32–34.5)
MCV RBC AUTO: 77.9 FL (ref 80–99.9)
MONOCYTES ABSOLUTE: 0.91 E9/L (ref 0.1–0.95)
MONOCYTES RELATIVE PERCENT: 6 % (ref 2–12)
NEUTROPHILS ABSOLUTE: 12.24 E9/L (ref 1.8–7.3)
NEUTROPHILS RELATIVE PERCENT: 80.9 % (ref 43–80)
PDW BLD-RTO: 15.2 FL (ref 11.5–15)
PLATELET # BLD: 329 E9/L (ref 130–450)
PMV BLD AUTO: 10.4 FL (ref 7–12)
POTASSIUM SERPL-SCNC: 3.6 MMOL/L (ref 3.5–5)
RBC # BLD: 3.76 E12/L (ref 3.8–5.8)
SEDIMENTATION RATE, ERYTHROCYTE: 82 MM/HR (ref 0–15)
SODIUM BLD-SCNC: 133 MMOL/L (ref 132–146)
TOTAL CK: 173 U/L (ref 20–200)
VANCOMYCIN TROUGH: <4 MCG/ML (ref 5–16)
WBC # BLD: 15.1 E9/L (ref 4.5–11.5)

## 2020-09-12 PROCEDURE — 2500000003 HC RX 250 WO HCPCS: Performed by: INTERNAL MEDICINE

## 2020-09-12 PROCEDURE — 6360000002 HC RX W HCPCS: Performed by: INTERNAL MEDICINE

## 2020-09-12 PROCEDURE — 2580000003 HC RX 258: Performed by: NURSE PRACTITIONER

## 2020-09-12 PROCEDURE — 2580000003 HC RX 258: Performed by: INTERNAL MEDICINE

## 2020-09-12 PROCEDURE — 36415 COLL VENOUS BLD VENIPUNCTURE: CPT

## 2020-09-12 PROCEDURE — 6370000000 HC RX 637 (ALT 250 FOR IP): Performed by: INTERNAL MEDICINE

## 2020-09-12 PROCEDURE — 93971 EXTREMITY STUDY: CPT

## 2020-09-12 PROCEDURE — 80202 ASSAY OF VANCOMYCIN: CPT

## 2020-09-12 PROCEDURE — 86140 C-REACTIVE PROTEIN: CPT

## 2020-09-12 PROCEDURE — 85025 COMPLETE CBC W/AUTO DIFF WBC: CPT

## 2020-09-12 PROCEDURE — 99253 IP/OBS CNSLTJ NEW/EST LOW 45: CPT | Performed by: SURGERY

## 2020-09-12 PROCEDURE — 82550 ASSAY OF CK (CPK): CPT

## 2020-09-12 PROCEDURE — 85651 RBC SED RATE NONAUTOMATED: CPT

## 2020-09-12 PROCEDURE — 1200000000 HC SEMI PRIVATE

## 2020-09-12 PROCEDURE — 73700 CT LOWER EXTREMITY W/O DYE: CPT

## 2020-09-12 PROCEDURE — 80048 BASIC METABOLIC PNL TOTAL CA: CPT

## 2020-09-12 PROCEDURE — 99233 SBSQ HOSP IP/OBS HIGH 50: CPT | Performed by: INTERNAL MEDICINE

## 2020-09-12 RX ORDER — CLINDAMYCIN PHOSPHATE 900 MG/50ML
900 INJECTION INTRAVENOUS EVERY 8 HOURS
Status: DISCONTINUED | OUTPATIENT
Start: 2020-09-12 | End: 2020-09-13

## 2020-09-12 RX ADMIN — SODIUM CHLORIDE: 9 INJECTION, SOLUTION INTRAVENOUS at 18:23

## 2020-09-12 RX ADMIN — CEFEPIME 2 G: 2 INJECTION, POWDER, FOR SOLUTION INTRAMUSCULAR; INTRAVENOUS at 14:26

## 2020-09-12 RX ADMIN — OXYCODONE HYDROCHLORIDE 5 MG: 5 TABLET ORAL at 23:39

## 2020-09-12 RX ADMIN — CLINDAMYCIN PHOSPHATE 900 MG: 900 INJECTION, SOLUTION INTRAVENOUS at 18:23

## 2020-09-12 RX ADMIN — SODIUM CHLORIDE, PRESERVATIVE FREE 10 ML: 5 INJECTION INTRAVENOUS at 23:45

## 2020-09-12 RX ADMIN — OXYCODONE HYDROCHLORIDE 5 MG: 5 TABLET ORAL at 14:25

## 2020-09-12 RX ADMIN — OXYCODONE HYDROCHLORIDE 5 MG: 5 TABLET ORAL at 09:58

## 2020-09-12 RX ADMIN — CLINDAMYCIN PHOSPHATE 900 MG: 900 INJECTION, SOLUTION INTRAVENOUS at 12:08

## 2020-09-12 RX ADMIN — VANCOMYCIN HYDROCHLORIDE 1000 MG: 1 INJECTION, POWDER, LYOPHILIZED, FOR SOLUTION INTRAVENOUS at 15:43

## 2020-09-12 RX ADMIN — SODIUM CHLORIDE, PRESERVATIVE FREE 10 ML: 5 INJECTION INTRAVENOUS at 23:44

## 2020-09-12 RX ADMIN — CEFEPIME 2 G: 2 INJECTION, POWDER, FOR SOLUTION INTRAMUSCULAR; INTRAVENOUS at 22:13

## 2020-09-12 RX ADMIN — ACETAMINOPHEN 650 MG: 325 TABLET ORAL at 05:54

## 2020-09-12 RX ADMIN — CEFEPIME 2 G: 2 INJECTION, POWDER, FOR SOLUTION INTRAMUSCULAR; INTRAVENOUS at 05:55

## 2020-09-12 RX ADMIN — VANCOMYCIN HYDROCHLORIDE 1000 MG: 1 INJECTION, POWDER, LYOPHILIZED, FOR SOLUTION INTRAVENOUS at 23:44

## 2020-09-12 ASSESSMENT — PAIN DESCRIPTION - PROGRESSION
CLINICAL_PROGRESSION: NOT CHANGED
CLINICAL_PROGRESSION: NOT CHANGED

## 2020-09-12 ASSESSMENT — PAIN SCALES - GENERAL
PAINLEVEL_OUTOF10: 7
PAINLEVEL_OUTOF10: 9
PAINLEVEL_OUTOF10: 7
PAINLEVEL_OUTOF10: 5

## 2020-09-12 ASSESSMENT — PAIN - FUNCTIONAL ASSESSMENT
PAIN_FUNCTIONAL_ASSESSMENT: ACTIVITIES ARE NOT PREVENTED
PAIN_FUNCTIONAL_ASSESSMENT: ACTIVITIES ARE NOT PREVENTED

## 2020-09-12 ASSESSMENT — PAIN DESCRIPTION - ORIENTATION
ORIENTATION: RIGHT
ORIENTATION: RIGHT

## 2020-09-12 ASSESSMENT — LIFESTYLE VARIABLES: SMOKING_STATUS: 1

## 2020-09-12 ASSESSMENT — PAIN DESCRIPTION - ONSET
ONSET: ON-GOING
ONSET: ON-GOING

## 2020-09-12 ASSESSMENT — PAIN DESCRIPTION - LOCATION
LOCATION: LEG
LOCATION: LEG

## 2020-09-12 ASSESSMENT — PAIN DESCRIPTION - DESCRIPTORS
DESCRIPTORS: SORE;SHOOTING;SHARP
DESCRIPTORS: SORE;SHOOTING;SHARP

## 2020-09-12 ASSESSMENT — PAIN DESCRIPTION - FREQUENCY
FREQUENCY: CONTINUOUS
FREQUENCY: CONTINUOUS

## 2020-09-12 ASSESSMENT — PAIN DESCRIPTION - PAIN TYPE
TYPE: ACUTE PAIN
TYPE: ACUTE PAIN

## 2020-09-12 NOTE — CONSULTS
Surgery History and Physical/Consult Note    CC:    Possible debridement    HPI:  This is a 28 y.o. male with PMH heroin use admitted 9/11/2020 with 2 weeks of progressive swelling of the RLE. He has an associated wound on the right thigh. He says the pain is overall improved currently. Reportedly started from heroin injection. Febrile this AM      PMH:  Past Medical History:   Diagnosis Date    Heroin use        PSH:  History reviewed. No pertinent surgical history. Family History:  History reviewed. No pertinent family history. Social History:   reports that he has been smoking. He has been smoking about 1.00 pack per day. He has never used smokeless tobacco. He reports current drug use. Review of Systems:  A 14pt complete review of systems was performed, and all pertinent positives and negatives are listed in the HPI. All other systems are negative. Allergies:  No Known Allergies    Medications:  Home medications:   Prior to Admission medications    Medication Sig Start Date End Date Taking?  Authorizing Provider   albuterol (PROAIR HFA) 108 (90 BASE) MCG/ACT inhaler Inhale 2 puffs into the lungs every 6 hours as needed for Wheezing 4/27/15 5/4/15  Adenike Davis MD       Scheduled medications:   clindamycin (CLEOCIN) IV  900 mg Intravenous Q8H    sodium chloride flush  10 mL Intravenous 2 times per day    sodium chloride flush  10 mL Intravenous 2 times per day    enoxaparin  40 mg Subcutaneous Daily    vancomycin  15 mg/kg Intravenous Q8H    cefepime  2 g Intravenous Q8H       PRN medications: sodium chloride flush, sodium chloride flush, acetaminophen **OR** acetaminophen, polyethylene glycol, promethazine **OR** ondansetron, nicotine polacrilex, ketorolac, oxyCODONE    Objective:    Physical Examination:  Vitals:    09/12/20 0015 09/12/20 0215 09/12/20 0545 09/12/20 0945   BP:   (!) 95/49 (!) 107/51   Pulse:    83   Resp:    16   Temp: 100.3 °F (37.9 °C)  103.1 °F (39.5 °C) 99.7 °F (37.6 °C) joint. Findings may be related to soft tissue cellulitis. Negative for soft tissue gas. 2. Negative for acute fracture, dislocation. This report has been electronically signed by Hermelinda Grier MD.    Xr Chest Portable    Result Date: 2020  Patient MRN:  [id number] : 1985 Age: 28 years Gender: Male Order Date:  2020 12:49 AM EXAM: XR CHEST PORTABLE COMPARISON: 2015 INDICATION:  sepsis sepsis FINDINGS: The heart is normal in size. No focal airspace opacity. There is no pleural effusion. There is no pneumothorax. No free air beneath the diaphragms. No airspace opacities or pleural effusion. Us Dup Lower Extremity Right Shaq    Result Date: 2020  Patient MRN:  [id number] : 1985 Age: 28 years Gender: Male Order Date:  2020 10:18 AM EXAM: US DUP LOWER EXTREMITY RIGHT SHAQ INDICATION:  rule out dvt rule out dvt COMPARISON: None FINDINGS:  Right common femoral, superficial femoral and popliteal veins are identified. They demonstrated Doppler signal, color flow, and compressibility. Visualized trifurcation vessels are normal.     No evidence of right leg DVT             ASSESSMENT & PLAN:    I have examined the patient and performed key aspects of physical exam, reviewed the record (including all pertinent and new radiology images and laboratory findings), and discussed the case with the surgical team.  I agree with the assessment and plan with the following additions, corrections, and changes. This is a 28 y.o. male admitted 2020 with RLE cellulitis, eschar. Plan:  1. abx per ID. 2. CT pending, although does not appear to have necrotizing infection  3. Will plan on debridement tomorrow. Clau Ruiz   2020   12:53 PM    NOTE: This report, in part or full, may have been transcribed using voice recognition software. Every effort was made to ensure accuracy; however, inadvertent computerized transcription errors may be present.  Please excuse any transcriptional grammatical or spelling errors that may have escaped my editorial review.

## 2020-09-12 NOTE — PROGRESS NOTES
Report called to [facility] on 5W. Charge RN ordering trasnport. Pt aware of transport.

## 2020-09-12 NOTE — PLAN OF CARE
"Damien Disla is a 46 year old male who is being evaluated via a telephone visit.      The patient has been notified of following:     \"This telephone visit will be conducted via a call between you and your physician/provider. We have found that certain health care needs can be provided without the need for a physical exam.  This service lets us provide the care you need with a short phone conversation.  If a prescription is necessary we can send it directly to your pharmacy.  If lab work is needed we can place an order for that and you can then stop by our lab to have the test done at a later time.    We will bill your insurance company for this service.  Please check with your medical insurance if this type of visit is covered. You may be responsible for the cost of this type of visit if insurance coverage is denied.  The typical cost is $30 (10min), $59 (11-20min) and $85 (21-30min).  Most often these visits are shorter than 10 minutes.    If during the course of the call the physician/provider feels a telephone visit is not appropriate, you will not be charged for this service.\"       Consent has been obtained for this service by 2 care team members: yes. See the scanned image in the medical record.    Damien Disla complains of  Telephone      I have reviewed and updated the patient's Past Medical History, Social History, Family History and Medication List.    ALLERGIES  Review of patient's allergies indicates no known allergies.    Didi Velasquez CMA   (MA signature)    Additional provider notes: Reviewed testerone results.  Has been using viagra for the last several weeks, no improvement over all in erection quality  Weak or no repsonse.  Testosterone levels low.  Advise supplement.  Reviewed risks, including increase in cholesterol, pamela need to monitor this.  Reviewed female menstrual cycle, that most women mentruate around day 14, advise timing intercourse with around day 11-2, abstain for 2-3 days before " Problem: Pain:  Goal: Pain level will decrease  Description: Pain level will decrease  Outcome: Met This Shift  Goal: Control of acute pain  Description: Control of acute pain  Outcome: Met This Shift     Problem: Falls - Risk of:  Goal: Will remain free from falls  Description: Will remain free from falls  Outcome: Met This Shift  Goal: Absence of physical injury  Description: Absence of physical injury  Outcome: Met This Shift hand.  Will start testosterone, f/u 1m for retesting.    Assessment/Plan:  (E29.1) Low testosterone in male  (primary encounter diagnosis)  Comment: will start supplement, will need to recheck testosterone level in 1m, recheck cholesterol im 3m  Plan: testosterone (ANDROGEL 1% PUMP) 12.5 MG/ACT         (1%) gel              RTC in 1m    Efren Fuller MD      I have reviewed the note as documented above.  This accurately captures the substance of my conversation with the patient,      Total time of call between patient and provider was 18 minutes

## 2020-09-12 NOTE — PROGRESS NOTES
[address] [place] [address] [facility]  Progress Note      Chief Complaint   Patient presents with    Nausea    Chills    Leg Swelling       SUBJECTIVE:      Patient is tolerating medications. No reported adverse drug reactions. No problems overnight. Complains of increased pain especially right lower leg. Recurrent fever this a.m. Review of systems:    As stated above in the chief complaint, otherwise negative. Medications:    Scheduled Meds:   sodium chloride flush  10 mL Intravenous 2 times per day    sodium chloride flush  10 mL Intravenous 2 times per day    enoxaparin  40 mg Subcutaneous Daily    vancomycin  15 mg/kg Intravenous Q8H    cefepime  2 g Intravenous Q8H     Continuous Infusions:   sodium chloride 75 mL/hr (20 7831)     PRN Meds:sodium chloride flush, sodium chloride flush, acetaminophen **OR** acetaminophen, polyethylene glycol, promethazine **OR** ondansetron, nicotine polacrilex, ketorolac, oxyCODONE  Prior to Admission medications    Medication Sig Start Date End Date Taking? Authorizing Provider   albuterol (PROAIR HFA) 108 (90 BASE) MCG/ACT inhaler Inhale 2 puffs into the lungs every 6 hours as needed for Wheezing 4/27/15 5/4/15  Naomi Soliz MD       OBJECTIVE:  BP (!) 95/49   Pulse 77   Temp 103.1 °F (39.5 °C) (Oral)   Resp 16   Ht 6' 1\" (1.854 m)   Wt 150 lb (68 kg)   SpO2 97%   BMI 19.79 kg/m²   Temp  Av °F (38.3 °C)  Min: 98.2 °F (36.8 °C)  Max: 103.1 °F (39.5 °C)  General appearance: Resting in bed in no apparent distress. Skin: Warm and dry. No rashes were noted. HEENT: Round and reactive pupils. Moist mucous membranes. No ulcerations or thrush. Neck: Supple to movements. Chest: No use of accessory muscles to breathe. Symmetrical expansion. No wheezing, crackles or rhonchi. Cardiovascular: Reguar rate and rhythm. No murmurs gallops, or rubs appreciated. Abdomen:  Bowel sounds present, nontender, nondistended, no masses or hepatosplenomegaly. Extremities: Wound right thigh remains erythematous with central eschar; right calf swollen and tender-increased since admission. Strong dorsalis pedis pulse right foot. Ankle also swollen and tender. Lines: peripheral    I/O last 3 completed shifts: In: 2104 [P. O.:645; I.V.:1155; IV Piggyback:304]  Out: -       Laboratory and Tests Review:      Lab Results   Component Value Date    WBC 15.7 (H) 09/11/2020    HGB 10.9 (L) 09/11/2020    HCT 33.8 (L) 09/11/2020    MCV 77.3 (L) 09/11/2020     09/11/2020     Lab Results   Component Value Date    NEUTROABS 13.21 (H) 09/11/2020     No results found for: CRPHS  Lab Results   Component Value Date    ALT 23 09/11/2020    AST 22 09/11/2020    ALKPHOS 119 09/11/2020    BILITOT 0.4 09/11/2020     Lab Results   Component Value Date     09/11/2020    K 3.3 09/11/2020    CL 97 09/11/2020    CO2 27 09/11/2020    BUN 8 09/11/2020    CREATININE 0.8 09/11/2020    GFRAA >60 09/11/2020    LABGLOM >60 09/11/2020    GLUCOSE 114 09/11/2020    PROT 7.1 09/11/2020    LABALBU 3.7 09/11/2020    CALCIUM 9.2 09/11/2020    BILITOT 0.4 09/11/2020    ALKPHOS 119 09/11/2020    AST 22 09/11/2020    ALT 23 09/11/2020     No results found for: CRP  No results found for: [address]    Radiology:    XR TIBIA FIBULA RIGHT (2 VIEWS)   Final Result   1. Subcutaneous edema left calf region. Soft tissue swelling surrounds the    ankle joint. Findings may be related to soft tissue cellulitis. Negative for    soft tissue gas. 2. Negative for acute fracture, dislocation. This report has been electronically signed by Geoffrey Patel MD.      XR CHEST PORTABLE   Final Result      No airspace opacities or pleural effusion.                 Microbiology:   Lab Results   Component Value Date    BC 24 Hours no growth 09/11/2020     Lab Results   Component Value Date    BLOODCULT2 24 Hours no growth 09/11/2020     No results found for: WNDABS  No results found for: RESPSMEAR  No

## 2020-09-12 NOTE — PROGRESS NOTES
Pharmacy Consultation Note  (Antibiotic Dosing and Monitoring)    Initial consult date: 2020  Consulting physician: Dr. Jamir Ledesma  Drug: Vancomycin  Indication: Cellulitis    Age/Gender Height Weight IBW Dosing weight  Allergy Information   35 y.o./male 6' 1\" (185.4 cm) 150 lb (68 kg)     Ideal body weight: 79.9 kg (176 lb 2.4 oz)  150 lb (68 kg)  Patient has no known allergies.       Temp (24hrs), Av.7 °F (38.2 °C), Min:98.2 °F (36.8 °C), Max:103.1 °F (39.5 °C)          Date  WBC BUN/SCr Drug/Dose Time   Given Level(s)   (Time) Comments    15.7 8/0.8 Vancomycin 1 g IV Q8H  0345  1242   X X Vancomycin 1 g IV Q8H                               Intake/Output Summary (Last 24 hours) at 2020 1014  Last data filed at 2020 0600  Gross per 24 hour   Intake 2104 ml   Output --   Net 2104 ml       Historical Cultures:  No results found for: [facility]  Recent Labs     20  0135   BC 24 Hours no growth       Cultures:  available culture and sensitivity results were reviewed in EPIC   blood cx - no growth to date   urine cx - pending   wound cx - pending    Assessment:  · Patient is a 28year old male currently ordered vancomycin and cefepime for cellulitis  · Estimated CrCl = >120 mL/min  · Patient lost IV access - trough level not drawn this morning  · Goal trough level = 15-20 mcg/mL; goal AUC/LIZZETTE >400    Plan:  · Will continue vancomycin 1 g IV every 8 hours  · Will check trough level at steady state if vancomycin continues  · Clinical pharmacy to follow    Marine Rush, PharmD, BCCCP  2020  10:14 AM

## 2020-09-12 NOTE — PROGRESS NOTES
Pt has very poor venous access and his IV does not draw. IV team was consulted by nightshift for vanc trough. They were re-consulted STAT for new STAT labwork ordered by infectious disease.  Awaiting arrival.

## 2020-09-12 NOTE — PROGRESS NOTES
[facility] Quality Flow/Interdisciplinary Rounds Progress Note        Quality Flow Rounds held on September 12, 2020    Disciplines Attending:  Bedside Nurse,  and     Maryjean Holter was admitted on 9/11/2020 12:27 AM    Anticipated Discharge Date:  Expected Discharge Date: 09/14/20    Disposition:    Jacob Score:  Jacob Scale Score: 19    Readmission Risk              Risk of Unplanned Readmission:        9           Discussed patient goal for the day, patient clinical progression, and barriers to discharge. The following Goal(s) of the Day/Commitment(s) have been identified:  ultrasound RLE today, check gen surg consult's plan.       Alena Madsen  September 12, 2020

## 2020-09-12 NOTE — PROGRESS NOTES
[facility] CARE AT [facility]   Progress Note    Admitting Date and Time: 9/11/2020 12:27 AM  Admit Dx: Cellulitis [L03.90]  Cellulitis [L03.90]    Subjective:    Patient was admitted with Cellulitis [L03.90]  Cellulitis [L03.90]. Patient denies fever, chills, cp, sob, n/v.     clindamycin (CLEOCIN) IV  900 mg Intravenous Q8H    sodium chloride flush  10 mL Intravenous 2 times per day    sodium chloride flush  10 mL Intravenous 2 times per day    enoxaparin  40 mg Subcutaneous Daily    vancomycin  15 mg/kg Intravenous Q8H    cefepime  2 g Intravenous Q8H     sodium chloride flush, 10 mL, PRN  sodium chloride flush, 10 mL, PRN  acetaminophen, 650 mg, Q6H PRN    Or  acetaminophen, 650 mg, Q6H PRN  polyethylene glycol, 17 g, Daily PRN  promethazine, 12.5 mg, Q6H PRN    Or  ondansetron, 4 mg, Q6H PRN  nicotine polacrilex, 2 mg, Q2H PRN  ketorolac, 30 mg, Q6H PRN  oxyCODONE, 5 mg, Q4H PRN         Objective:    BP (!) 109/55   Pulse 79   Temp 99.1 °F (37.3 °C) (Oral)   Resp 16   Ht 6' 1\" (1.854 m)   Wt 150 lb (68 kg)   SpO2 97%   BMI 19.79 kg/m²       General:  Appears comfortable. Answers questions appropriately and cooperative with exam  HEENT:  Mucous membranes moist. No erythema, rhinorrhea, or post-nasal drip noted. Neck:  No carotid bruits. Heart:  Rhythm regular at rate of 80  Lungs:  CTA. No wheeze, rales, or rhonchi  Abdomen:  Positive bowel sounds positive. Soft. Non-tender. No guarding, rebound or rigidity. Breast/Rectal/Genitourinary: not pertinent. Extremities:  Negative for lower extremity edema  Skin:  Warm and dry. rle with erythema   Vascular: 2/4 Dorsalis Pedis pulses bilaterally. Neuro:  Cranial nerves 2-12 grossly intact, no focal weakness or change in sensation noted. Extraocular muscles intact. Pupils equal, round, reactive to light.               Recent Labs     09/11/20  0135 09/12/20  1335    133   K 3.3* 3.6   CL 97* 98   CO2 27 24   BUN 8 11   CREATININE 0.8 0.8   GLUCOSE 114* 128*   CALCIUM 9.2 8.4*       Recent Labs     09/11/20  0135 09/12/20  1335   WBC 15.7* 15.1*   RBC 4.37 3.76*   HGB 10.9* 9.2*   HCT 33.8* 29.3*   MCV 77.3* 77.9*   MCH 24.9* 24.5*   MCHC 32.2 31.4*   RDW 14.7 15.2*    329   MPV 9.7 10.4       CBC with Differential:    Lab Results   Component Value Date    WBC 15.1 09/12/2020    RBC 3.76 09/12/2020    HGB 9.2 09/12/2020    HCT 29.3 09/12/2020     09/12/2020    MCV 77.9 09/12/2020    MCH 24.5 09/12/2020    MCHC 31.4 09/12/2020    RDW 15.2 09/12/2020    LYMPHOPCT 10.7 09/12/2020    MONOPCT 6.0 09/12/2020    BASOPCT 0.4 09/12/2020    MONOSABS 0.91 09/12/2020    LYMPHSABS 1.62 09/12/2020    EOSABS 0.03 09/12/2020    BASOSABS 0.06 09/12/2020     BMP:    Lab Results   Component Value Date     09/12/2020    K 3.6 09/12/2020    CL 98 09/12/2020    CO2 24 09/12/2020    BUN 11 09/12/2020    LABALBU 3.7 09/11/2020    CREATININE 0.8 09/12/2020    CALCIUM 8.4 09/12/2020    GFRAA >60 09/12/2020    LABGLOM >60 09/12/2020    GLUCOSE 128 09/12/2020        Radiology:   US DUP LOWER EXTREMITY RIGHT SHAQ   Final Result   No evidence of right leg DVT               XR TIBIA FIBULA RIGHT (2 VIEWS)   Final Result   1. Subcutaneous edema left calf region. Soft tissue swelling surrounds the    ankle joint. Findings may be related to soft tissue cellulitis. Negative for    soft tissue gas. 2. Negative for acute fracture, dislocation. This report has been electronically signed by Storm Sun MD.      XR CHEST PORTABLE   Final Result      No airspace opacities or pleural effusion. CT TIBIA FIBULA RIGHT WO CONTRAST    (Results Pending)       Assessment:    Principal Problem:    Cellulitis  Active Problems:    Heroin abuse ([address]    Tobacco abuse  Resolved Problems:    * No resolved hospital problems. *      Plan:  1. Sepsis(fever, tachycardia, infection)POA supportive care and tx underlying infection  2.  RLE cellulitis associated with right thigh wound from heroin injection abx per ID. Surgery consulted. 3. Heroin abuse monitor pt  4. Hypokalemia monitor and replace prn  5.  Hyperglycemia likely due to stress  montior    Chart reviewed and updated by nursing    Time spent is 35 min    Electronically signed by Emma Ross DO on 9/12/2020 at 6:59 PM

## 2020-09-12 NOTE — ANESTHESIA PRE PROCEDURE
Department of Anesthesiology  Preprocedure Note       Name:  Maryjean Holter   Age:  28 y.o.  :  1985                                          MRN:  [id number]         Date:  2020      Surgeon: Topher Robles):  Clau Ruiz MD    Procedure: Procedure(s):  LEG LESION BIOPSY EXCISION    Medications prior to admission:   Prior to Admission medications    Medication Sig Start Date End Date Taking?  Authorizing Provider   albuterol (PROAIR HFA) 108 (90 BASE) MCG/ACT inhaler Inhale 2 puffs into the lungs every 6 hours as needed for Wheezing 4/27/15 5/4/15  Mushtaq Wallace MD       Current medications:    Current Facility-Administered Medications   Medication Dose Route Frequency Provider Last Rate Last Dose    vancomycin (VANCOCIN) 1,250 mg in dextrose 5 % 250 mL IVPB  1,250 mg Intravenous Q8H Mariama Rosa MD        clindamycin (CLEOCIN) 900 mg in dextrose 5 % 50 mL IVPB  900 mg Intravenous Q8H Belrafa Sow, DO 50 mL/hr at 20 0926 900 mg at 20 0926    sodium chloride flush 0.9 % injection 10 mL  10 mL Intravenous 2 times per day AL Napier - CNP   10 mL at 20 2345    sodium chloride flush 0.9 % injection 10 mL  10 mL Intravenous PRN AL Napier - CNP   10 mL at 20 1452    sodium chloride flush 0.9 % injection 10 mL  10 mL Intravenous 2 times per day Mariama Rosa MD   10 mL at 20 0818    sodium chloride flush 0.9 % injection 10 mL  10 mL Intravenous PRN Mariama Rosa MD        acetaminophen (TYLENOL) tablet 650 mg  650 mg Oral Q6H PRN Mariama Rosa MD   650 mg at 20 0554    Or    acetaminophen (TYLENOL) suppository 650 mg  650 mg Rectal Q6H PRN Mariama Rosa MD        polyethylene glycol (GLYCOLAX) packet 17 g  17 g Oral Daily PRN Mariama Rosa MD        promethazine (PHENERGAN) tablet 12.5 mg  12.5 mg Oral Q6H PRN Mariama Rosa MD        Or    ondansetron [facility]) injection 4 mg  4 mg Intravenous Q6H PRN MD Ramila Frey enoxaparin (LOVENOX) injection 40 mg  40 mg Subcutaneous Daily Thanh Gilman MD        0.9 % sodium chloride infusion   Intravenous Continuous Thanh Gilman MD 75 mL/hr at 09/13/20 0933      nicotine polacrilex (COMMIT) lozenge 2 mg  2 mg Oral Q2H PRN Thanh Gilman MD        ketorolac (TORADOL) injection 30 mg  30 mg Intravenous Q6H PRN Thanh Gilman MD        oxyCODONE (ROXICODONE) immediate release tablet 5 mg  5 mg Oral Q4H PRN Kolby Hric, DO   5 mg at 09/13/20 0803    cefepime (MAXIPIME) 2 g IVPB extended (mini-bag)  2 g Intravenous [facility], DO   Stopped at 09/13/20 4506       Allergies:  No Known Allergies    Problem List:    Patient Active Problem List   Diagnosis Code    Cellulitis L03.90    Heroin abuse ([facility] [address] F11.10    Tobacco abuse Z72.0    Cellulitis of right leg L03.115    Sepsis ([facility] [address] A41.9    Hypokalemia E87.6       Past Medical History:        Diagnosis Date    Heroin use        Past Surgical History:  History reviewed. No pertinent surgical history.     Social History:    Social History     Tobacco Use    Smoking status: Current Every Day Smoker     Packs/day: 1.00    Smokeless tobacco: Never Used   Substance Use Topics    Alcohol use: Not on file                                Ready to quit: Not Answered  Counseling given: Not Answered      Vital Signs (Current):   Vitals:    09/12/20 1955 09/12/20 2339 09/13/20 0128 09/13/20 1000   BP: (!) 104/52 (!) 120/55  (!) 119/57   Pulse: 82 90  72   Resp: 16 16  16   Temp: 99.7 °F (37.6 °C) 99.9 °F (37.7 °C)     TempSrc: Oral Oral  Temporal   SpO2: 95%   97%   Weight:   163 lb 4.8 oz (74.1 kg)    Height:                                                  BP Readings from Last 3 Encounters:   09/13/20 (!) 119/57       NPO Status: Time of last liquid consumption: 1900                        Time of last solid consumption: 1900                        Date of last liquid consumption: 09/12/20                        Date of last solid food consumption: 09/12/20    BMI:   Wt Readings from Last 3 Encounters:   09/13/20 163 lb 4.8 oz (74.1 kg)     Body mass index is 21.54 kg/m². CBC:   Lab Results   Component Value Date    WBC 15.1 09/12/2020    RBC 3.76 09/12/2020    HGB 9.2 09/12/2020    HCT 29.3 09/12/2020    MCV 77.9 09/12/2020    RDW 15.2 09/12/2020     09/12/2020       CMP:   Lab Results   Component Value Date     09/12/2020    K 3.6 09/12/2020    CL 98 09/12/2020    CO2 24 09/12/2020    BUN 11 09/12/2020    CREATININE 0.8 09/12/2020    GFRAA >60 09/12/2020    LABGLOM >60 09/12/2020    GLUCOSE 128 09/12/2020    PROT 7.1 09/11/2020    CALCIUM 8.4 09/12/2020    BILITOT 0.4 09/11/2020    ALKPHOS 119 09/11/2020    AST 22 09/11/2020    ALT 23 09/11/2020       POC Tests: No results for input(s): POCGLU, POCNA, POCK, POCCL, POCBUN, POCHEMO, POCHCT in the last 72 hours.     Coags: No results found for: PROTIME, INR, APTT    HCG (If Applicable): No results found for: PREGTESTUR, PREGSERUM, HCG, HCGQUANT     ABGs: No results found for: PHART, PO2ART, THW7RRF, XBW8BYK, BEART, O3PTHLAL     Type & Screen (If Applicable):  No results found for: LABABO, LABRH    Drug/Infectious Status (If Applicable):  No results found for: HIV, HEPCAB    COVID-19 Screening (If Applicable):   Lab Results   Component Value Date    COVID19 Not Detected 09/11/2020         Anesthesia Evaluation  Patient summary reviewed no history of anesthetic complications:   Airway: Mallampati: I  TM distance: >3 FB   Neck ROM: full  Mouth opening: > = 3 FB Dental:      Comment: Poor dentition    Pulmonary:Negative Pulmonary ROS breath sounds clear to auscultation  (+) current smoker                           Cardiovascular:Negative CV ROS            Rhythm: regular  Rate: normal           Beta Blocker:  Not on Beta Blocker         Neuro/Psych:   Negative Neuro/Psych ROS              GI/Hepatic/Renal: Neg GI/Hepatic/Renal ROS            Endo/Other: ROS comment: heroin abuse Abdominal:           Vascular: negative vascular ROS. Anesthesia Plan      general     ASA 3     (Pt agrees to GA--IV induction. NPO >12 hours.)  Induction: intravenous. MIPS: Postoperative opioids intended and Prophylactic antiemetics administered. Anesthetic plan and risks discussed with patient. Plan discussed with CRNA.     Attending anesthesiologist reviewed and agrees with Pre Eval content            Li Pantoja MD   9/13/2020      Patient will need to be re-evaluated prior to surgery by DOS anesthesiologist.    Li Pantoja MD           9/13/2020        10:22 AM

## 2020-09-13 ENCOUNTER — ANESTHESIA (OUTPATIENT)
Dept: OPERATING ROOM | Age: 35
DRG: 854 | End: 2020-09-13

## 2020-09-13 VITALS — SYSTOLIC BLOOD PRESSURE: 103 MMHG | OXYGEN SATURATION: 100 % | TEMPERATURE: 98.2 F | DIASTOLIC BLOOD PRESSURE: 53 MMHG

## 2020-09-13 VITALS
HEART RATE: 83 BPM | RESPIRATION RATE: 16 BRPM | DIASTOLIC BLOOD PRESSURE: 62 MMHG | WEIGHT: 163.3 LBS | HEIGHT: 73 IN | BODY MASS INDEX: 21.64 KG/M2 | SYSTOLIC BLOOD PRESSURE: 134 MMHG | OXYGEN SATURATION: 100 % | TEMPERATURE: 97.8 F

## 2020-09-13 LAB
URINE CULTURE, ROUTINE: NORMAL
VANCOMYCIN TROUGH: 6.6 MCG/ML (ref 5–16)
WOUND/ABSCESS: NORMAL

## 2020-09-13 PROCEDURE — 36415 COLL VENOUS BLD VENIPUNCTURE: CPT

## 2020-09-13 PROCEDURE — 87205 SMEAR GRAM STAIN: CPT

## 2020-09-13 PROCEDURE — 6370000000 HC RX 637 (ALT 250 FOR IP): Performed by: INTERNAL MEDICINE

## 2020-09-13 PROCEDURE — 7100000000 HC PACU RECOVERY - FIRST 15 MIN: Performed by: SURGERY

## 2020-09-13 PROCEDURE — 3600000002 HC SURGERY LEVEL 2 BASE: Performed by: SURGERY

## 2020-09-13 PROCEDURE — 87206 SMEAR FLUORESCENT/ACID STAI: CPT

## 2020-09-13 PROCEDURE — 87186 SC STD MICRODIL/AGAR DIL: CPT

## 2020-09-13 PROCEDURE — 2580000003 HC RX 258: Performed by: NURSE ANESTHETIST, CERTIFIED REGISTERED

## 2020-09-13 PROCEDURE — 87070 CULTURE OTHR SPECIMN AEROBIC: CPT

## 2020-09-13 PROCEDURE — 2500000003 HC RX 250 WO HCPCS: Performed by: NURSE ANESTHETIST, CERTIFIED REGISTERED

## 2020-09-13 PROCEDURE — 6360000002 HC RX W HCPCS: Performed by: INTERNAL MEDICINE

## 2020-09-13 PROCEDURE — 87081 CULTURE SCREEN ONLY: CPT

## 2020-09-13 PROCEDURE — 87075 CULTR BACTERIA EXCEPT BLOOD: CPT

## 2020-09-13 PROCEDURE — 3700000000 HC ANESTHESIA ATTENDED CARE: Performed by: SURGERY

## 2020-09-13 PROCEDURE — 87015 SPECIMEN INFECT AGNT CONCNTJ: CPT

## 2020-09-13 PROCEDURE — 6360000002 HC RX W HCPCS

## 2020-09-13 PROCEDURE — 80202 ASSAY OF VANCOMYCIN: CPT

## 2020-09-13 PROCEDURE — 7100000001 HC PACU RECOVERY - ADDTL 15 MIN: Performed by: SURGERY

## 2020-09-13 PROCEDURE — 0JBL0ZZ EXCISION OF RIGHT UPPER LEG SUBCUTANEOUS TISSUE AND FASCIA, OPEN APPROACH: ICD-10-PCS | Performed by: INTERNAL MEDICINE

## 2020-09-13 PROCEDURE — 3600000012 HC SURGERY LEVEL 2 ADDTL 15MIN: Performed by: SURGERY

## 2020-09-13 PROCEDURE — 6360000002 HC RX W HCPCS: Performed by: NURSE ANESTHETIST, CERTIFIED REGISTERED

## 2020-09-13 PROCEDURE — 11045 DBRDMT SUBQ TISS EACH ADDL: CPT | Performed by: SURGERY

## 2020-09-13 PROCEDURE — 2500000003 HC RX 250 WO HCPCS: Performed by: INTERNAL MEDICINE

## 2020-09-13 PROCEDURE — 2709999900 HC NON-CHARGEABLE SUPPLY: Performed by: SURGERY

## 2020-09-13 PROCEDURE — 88304 TISSUE EXAM BY PATHOLOGIST: CPT

## 2020-09-13 PROCEDURE — 3700000001 HC ADD 15 MINUTES (ANESTHESIA): Performed by: SURGERY

## 2020-09-13 PROCEDURE — 87176 TISSUE HOMOGENIZATION CULTR: CPT

## 2020-09-13 PROCEDURE — 2580000003 HC RX 258: Performed by: INTERNAL MEDICINE

## 2020-09-13 PROCEDURE — 87077 CULTURE AEROBIC IDENTIFY: CPT

## 2020-09-13 PROCEDURE — 87116 MYCOBACTERIA CULTURE: CPT

## 2020-09-13 PROCEDURE — 99232 SBSQ HOSP IP/OBS MODERATE 35: CPT | Performed by: INTERNAL MEDICINE

## 2020-09-13 PROCEDURE — 87102 FUNGUS ISOLATION CULTURE: CPT

## 2020-09-13 PROCEDURE — 11042 DBRDMT SUBQ TIS 1ST 20SQCM/<: CPT | Performed by: SURGERY

## 2020-09-13 RX ORDER — MIDAZOLAM HYDROCHLORIDE 1 MG/ML
INJECTION INTRAMUSCULAR; INTRAVENOUS PRN
Status: DISCONTINUED | OUTPATIENT
Start: 2020-09-13 | End: 2020-09-13 | Stop reason: SDUPTHER

## 2020-09-13 RX ORDER — PROPOFOL 10 MG/ML
INJECTION, EMULSION INTRAVENOUS PRN
Status: DISCONTINUED | OUTPATIENT
Start: 2020-09-13 | End: 2020-09-13 | Stop reason: SDUPTHER

## 2020-09-13 RX ORDER — SODIUM CHLORIDE 9 MG/ML
INJECTION, SOLUTION INTRAVENOUS EVERY 8 HOURS
Status: DISCONTINUED | OUTPATIENT
Start: 2020-09-13 | End: 2020-09-14 | Stop reason: HOSPADM

## 2020-09-13 RX ORDER — SODIUM CHLORIDE 9 MG/ML
INJECTION, SOLUTION INTRAVENOUS CONTINUOUS PRN
Status: DISCONTINUED | OUTPATIENT
Start: 2020-09-13 | End: 2020-09-13 | Stop reason: SDUPTHER

## 2020-09-13 RX ORDER — DEXAMETHASONE SODIUM PHOSPHATE 4 MG/ML
INJECTION, SOLUTION INTRA-ARTICULAR; INTRALESIONAL; INTRAMUSCULAR; INTRAVENOUS; SOFT TISSUE PRN
Status: DISCONTINUED | OUTPATIENT
Start: 2020-09-13 | End: 2020-09-13 | Stop reason: SDUPTHER

## 2020-09-13 RX ORDER — ONDANSETRON 2 MG/ML
INJECTION INTRAMUSCULAR; INTRAVENOUS PRN
Status: DISCONTINUED | OUTPATIENT
Start: 2020-09-13 | End: 2020-09-13 | Stop reason: SDUPTHER

## 2020-09-13 RX ORDER — FENTANYL CITRATE 50 UG/ML
INJECTION, SOLUTION INTRAMUSCULAR; INTRAVENOUS PRN
Status: DISCONTINUED | OUTPATIENT
Start: 2020-09-13 | End: 2020-09-13 | Stop reason: SDUPTHER

## 2020-09-13 RX ORDER — FENTANYL CITRATE 50 UG/ML
25 INJECTION, SOLUTION INTRAMUSCULAR; INTRAVENOUS EVERY 5 MIN PRN
Status: DISCONTINUED | OUTPATIENT
Start: 2020-09-13 | End: 2020-09-13 | Stop reason: HOSPADM

## 2020-09-13 RX ORDER — LIDOCAINE HYDROCHLORIDE 20 MG/ML
INJECTION, SOLUTION EPIDURAL; INFILTRATION; INTRACAUDAL; PERINEURAL PRN
Status: DISCONTINUED | OUTPATIENT
Start: 2020-09-13 | End: 2020-09-13 | Stop reason: SDUPTHER

## 2020-09-13 RX ADMIN — ONDANSETRON 4 MG: 2 INJECTION INTRAMUSCULAR; INTRAVENOUS at 10:36

## 2020-09-13 RX ADMIN — VANCOMYCIN HYDROCHLORIDE 1250 MG: 10 INJECTION, POWDER, LYOPHILIZED, FOR SOLUTION INTRAVENOUS at 13:58

## 2020-09-13 RX ADMIN — LIDOCAINE HYDROCHLORIDE 40 MG: 20 INJECTION, SOLUTION EPIDURAL; INFILTRATION; INTRACAUDAL; PERINEURAL at 10:36

## 2020-09-13 RX ADMIN — DEXAMETHASONE SODIUM PHOSPHATE 10 MG: 4 INJECTION, SOLUTION INTRAMUSCULAR; INTRAVENOUS at 10:36

## 2020-09-13 RX ADMIN — SODIUM CHLORIDE: 9 INJECTION, SOLUTION INTRAVENOUS at 09:33

## 2020-09-13 RX ADMIN — KETOROLAC TROMETHAMINE 30 MG: 30 INJECTION, SOLUTION INTRAMUSCULAR at 11:29

## 2020-09-13 RX ADMIN — CLINDAMYCIN PHOSPHATE 900 MG: 900 INJECTION, SOLUTION INTRAVENOUS at 03:00

## 2020-09-13 RX ADMIN — HYDROMORPHONE HYDROCHLORIDE 0.25 MG: 1 INJECTION, SOLUTION INTRAMUSCULAR; INTRAVENOUS; SUBCUTANEOUS at 11:30

## 2020-09-13 RX ADMIN — CEFEPIME 2 G: 2 INJECTION, POWDER, FOR SOLUTION INTRAMUSCULAR; INTRAVENOUS at 06:00

## 2020-09-13 RX ADMIN — FENTANYL CITRATE 50 MCG: 50 INJECTION, SOLUTION INTRAMUSCULAR; INTRAVENOUS at 11:11

## 2020-09-13 RX ADMIN — SODIUM CHLORIDE, PRESERVATIVE FREE 10 ML: 5 INJECTION INTRAVENOUS at 13:58

## 2020-09-13 RX ADMIN — VANCOMYCIN HYDROCHLORIDE 1000 MG: 1 INJECTION, POWDER, LYOPHILIZED, FOR SOLUTION INTRAVENOUS at 09:28

## 2020-09-13 RX ADMIN — PROPOFOL 200 MG: 10 INJECTION, EMULSION INTRAVENOUS at 10:36

## 2020-09-13 RX ADMIN — CEFEPIME 2 G: 2 INJECTION, POWDER, FOR SOLUTION INTRAMUSCULAR; INTRAVENOUS at 16:14

## 2020-09-13 RX ADMIN — SODIUM CHLORIDE, PRESERVATIVE FREE 10 ML: 5 INJECTION INTRAVENOUS at 08:18

## 2020-09-13 RX ADMIN — OXYCODONE HYDROCHLORIDE 5 MG: 5 TABLET ORAL at 08:03

## 2020-09-13 RX ADMIN — FENTANYL CITRATE 50 MCG: 50 INJECTION, SOLUTION INTRAMUSCULAR; INTRAVENOUS at 10:36

## 2020-09-13 RX ADMIN — MIDAZOLAM 2 MG: 1 INJECTION INTRAMUSCULAR; INTRAVENOUS at 10:24

## 2020-09-13 RX ADMIN — Medication 0.25 MG: at 11:30

## 2020-09-13 RX ADMIN — SODIUM CHLORIDE: 9 INJECTION, SOLUTION INTRAVENOUS at 10:07

## 2020-09-13 RX ADMIN — CLINDAMYCIN PHOSPHATE 900 MG: 900 INJECTION, SOLUTION INTRAVENOUS at 09:26

## 2020-09-13 ASSESSMENT — PAIN SCALES - GENERAL
PAINLEVEL_OUTOF10: 9
PAINLEVEL_OUTOF10: 9
PAINLEVEL_OUTOF10: 8
PAINLEVEL_OUTOF10: 6
PAINLEVEL_OUTOF10: 7
PAINLEVEL_OUTOF10: 4
PAINLEVEL_OUTOF10: 5

## 2020-09-13 ASSESSMENT — PULMONARY FUNCTION TESTS
PIF_VALUE: 0
PIF_VALUE: 2
PIF_VALUE: 2
PIF_VALUE: 20
PIF_VALUE: 1
PIF_VALUE: 0
PIF_VALUE: 15
PIF_VALUE: 0
PIF_VALUE: 1
PIF_VALUE: 15
PIF_VALUE: 0
PIF_VALUE: 20
PIF_VALUE: 1
PIF_VALUE: 1
PIF_VALUE: 0
PIF_VALUE: 17
PIF_VALUE: 15
PIF_VALUE: 15
PIF_VALUE: 1
PIF_VALUE: 1
PIF_VALUE: 0
PIF_VALUE: 1
PIF_VALUE: 20
PIF_VALUE: 20
PIF_VALUE: 16
PIF_VALUE: 17
PIF_VALUE: 15
PIF_VALUE: 0
PIF_VALUE: 20
PIF_VALUE: 20
PIF_VALUE: 17
PIF_VALUE: 15
PIF_VALUE: 0
PIF_VALUE: 0
PIF_VALUE: 20
PIF_VALUE: 4
PIF_VALUE: 15
PIF_VALUE: 3
PIF_VALUE: 3
PIF_VALUE: 0
PIF_VALUE: 17
PIF_VALUE: 20
PIF_VALUE: 0
PIF_VALUE: 15
PIF_VALUE: 20
PIF_VALUE: 2

## 2020-09-13 ASSESSMENT — PAIN DESCRIPTION - ORIENTATION: ORIENTATION: RIGHT

## 2020-09-13 ASSESSMENT — PAIN DESCRIPTION - PROGRESSION: CLINICAL_PROGRESSION: GRADUALLY IMPROVING

## 2020-09-13 ASSESSMENT — PAIN DESCRIPTION - LOCATION: LOCATION: LEG

## 2020-09-13 ASSESSMENT — PAIN DESCRIPTION - ONSET: ONSET: ON-GOING

## 2020-09-13 ASSESSMENT — PAIN DESCRIPTION - DESCRIPTORS
DESCRIPTORS: ACHING;DISCOMFORT;DULL
DESCRIPTORS: ACHING;DISCOMFORT;PRESSURE

## 2020-09-13 ASSESSMENT — PAIN - FUNCTIONAL ASSESSMENT
PAIN_FUNCTIONAL_ASSESSMENT: 0-10
PAIN_FUNCTIONAL_ASSESSMENT: ACTIVITIES ARE NOT PREVENTED

## 2020-09-13 ASSESSMENT — PAIN DESCRIPTION - FREQUENCY: FREQUENCY: CONTINUOUS

## 2020-09-13 ASSESSMENT — PAIN DESCRIPTION - PAIN TYPE: TYPE: ACUTE PAIN;SURGICAL PAIN

## 2020-09-13 NOTE — PROGRESS NOTES
Pharmacy Consultation Note  (Antibiotic Dosing and Monitoring)    Initial consult date: 2020  Consulting physician: Dr. Moralez Marking  Drug: Vancomycin  Indication: Cellulitis    Age/Gender Height Weight IBW Dosing weight  Allergy Information   35 y.o./male 6' 1\" (185.4 cm) 150 lb (68 kg)     Ideal body weight: 79.9 kg (176 lb 2.4 oz)  150 lb (68 kg)  Patient has no known allergies.       Temp (24hrs), Av.6 °F (37.6 °C), Min:99.1 °F (37.3 °C), Max:99.9 °F (37.7 °C)          Date  WBC BUN/SCr Drug/Dose Time   Given Level(s)   (Time) Comments    15.7 8/0.8 Vancomycin 1 g IV Q8H  0345  1242   15.1 11/0.8 Vancomycin 1 g IV Q8H 1543  2344 Trough: <4 mcg/mL (1335)     X X Vancomycin 1 g IV Q8H  -------------------------------  Vancomycin 1250 mg IV Q8H 0928  --------                      Intake/Output Summary (Last 24 hours) at 2020 0751  Last data filed at 2020 1823  Gross per 24 hour   Intake 1130 ml   Output --   Net 1130 ml       Historical Cultures:  No results found for: [facility]  Recent Labs     20  0135   BC 24 Hours no growth       Cultures:  available culture and sensitivity results were reviewed in EPIC   blood cx - no growth to date   urine cx - negative   wound cx - mixed GNR   MRSA screen - pending    Assessment:  · Patient is a 28year old male currently ordered vancomycin and cefepime for cellulitis  · Estimated CrCl = >120 mL/min  · Vancomycin level drawn at 1335 on  was <4 mcg/mL (drawn 17.5 after the previous dose)  · Trough level from 0820 on  was 6.6 mcg/mL  · Goal trough level = 15-20 mcg/mL; goal AUC/LIZZETTE >400    Plan:  · Will adjust to vancomycin 1250 mg IV every 8 hours  · Will recheck trough level pat steady state if vancomycin continue  · Clinical pharmacy to follow    Sameer Cordero PharmD, [facility]  2020  7:51 AM

## 2020-09-13 NOTE — PROGRESS NOTES
[facility] AT [facility]   Progress Note    Admitting Date and Time: 9/11/2020 12:27 AM  Admit Dx: Cellulitis [L03.90]  Cellulitis [L03.90]    Subjective:    Patient was admitted with Cellulitis [L03.90]  Cellulitis [L03.90].  Patient denies fever, chills, cp, sob, n/v.     vancomycin  1,250 mg Intravenous Q8H    sodium chloride flush  10 mL Intravenous 2 times per day    sodium chloride flush  10 mL Intravenous 2 times per day    enoxaparin  40 mg Subcutaneous Daily    cefepime  2 g Intravenous Q8H     sodium chloride flush, 10 mL, PRN  sodium chloride flush, 10 mL, PRN  acetaminophen, 650 mg, Q6H PRN    Or  acetaminophen, 650 mg, Q6H PRN  polyethylene glycol, 17 g, Daily PRN  promethazine, 12.5 mg, Q6H PRN    Or  ondansetron, 4 mg, Q6H PRN  nicotine polacrilex, 2 mg, Q2H PRN  ketorolac, 30 mg, Q6H PRN  oxyCODONE, 5 mg, Q4H PRN         Objective:    BP (!) 102/50   Pulse 66   Temp 98.6 °F (37 °C) (Oral)   Resp 16   Ht 6' 1\" (1.854 m)   Wt 163 lb 4.8 oz (74.1 kg)   SpO2 96%   BMI 21.54 kg/m²   Skin: warm and dry, no rash or erythema  Pulmonary/Chest: clear to auscultation bilaterally- no wheezes, rales or rhonchi, normal air movement, no respiratory distress  Cardiovascular: rhythm reg at rate of 68  Abdomen: soft, non-tender, non-distended, normal bowel sounds, no masses or organomegaly  Extremities: no cyanosis, no clubbing and no edema      Recent Labs     09/11/20 0135 09/12/20  1335    133   K 3.3* 3.6   CL 97* 98   CO2 27 24   BUN 8 11   CREATININE 0.8 0.8   GLUCOSE 114* 128*   CALCIUM 9.2 8.4*       Recent Labs     09/11/20 0135 09/12/20  1335   WBC 15.7* 15.1*   RBC 4.37 3.76*   HGB 10.9* 9.2*   HCT 33.8* 29.3*   MCV 77.3* 77.9*   MCH 24.9* 24.5*   MCHC 32.2 31.4*   RDW 14.7 15.2*    329   MPV 9.7 10.4            Radiology:   CT TIBIA FIBULA RIGHT WO CONTRAST   Final Result   Findings compatible with edema or cellulitis             US DUP LOWER EXTREMITY RIGHT SHAQ Final Result   No evidence of right leg DVT               XR TIBIA FIBULA RIGHT (2 VIEWS)   Final Result   1. Subcutaneous edema left calf region. Soft tissue swelling surrounds the    ankle joint. Findings may be related to soft tissue cellulitis. Negative for    soft tissue gas. 2. Negative for acute fracture, dislocation. This report has been electronically signed by Storm Sun MD.      XR CHEST PORTABLE   Final Result      No airspace opacities or pleural effusion. Assessment:    Principal Problem:    Cellulitis  Active Problems:    Heroin abuse (HCC)    Tobacco abuse    Cellulitis of right leg    Sepsis ([address]    Hypokalemia  Resolved Problems:    * No resolved hospital problems. *      Plan:  1. Sepsis(fever, tachycardia, infection)POA supportive care and tx underlying infection  2. RLE cellulitis associated with right thigh wound from heroin injection abx per ID. Pt s/p debridement by surgery  3. Heroin abuse monitor pt  4. Hypokalemia monitor and replace prn  5.  Hyperglycemia likely due to stress  monitor        Electronically signed by Mago Holloway DO on 9/13/2020 at 7:30 PM

## 2020-09-13 NOTE — PROGRESS NOTES
[address] [place] [address] [facility]  Progress Note      Chief Complaint   Patient presents with    Nausea    Chills    Leg Swelling       SUBJECTIVE:      Patient is tolerating medications. No reported adverse drug reactions. No problems overnight. Tolerated surgery well, leg feels better today. Review of systems:    As stated above in the chief complaint, otherwise negative. Medications:    Scheduled Meds:   vancomycin  1,250 mg Intravenous Q8H    clindamycin (CLEOCIN) IV  900 mg Intravenous Q8H    sodium chloride flush  10 mL Intravenous 2 times per day    sodium chloride flush  10 mL Intravenous 2 times per day    enoxaparin  40 mg Subcutaneous Daily    cefepime  2 g Intravenous Q8H     Continuous Infusions:   sodium chloride 75 mL/hr at 20 0933     PRN Meds:sodium chloride flush, sodium chloride flush, acetaminophen **OR** acetaminophen, polyethylene glycol, promethazine **OR** ondansetron, nicotine polacrilex, ketorolac, oxyCODONE  Prior to Admission medications    Medication Sig Start Date End Date Taking? Authorizing Provider   albuterol (PROAIR HFA) 108 (90 BASE) MCG/ACT inhaler Inhale 2 puffs into the lungs every 6 hours as needed for Wheezing 4/27/15 5/4/15  Jose Ramon Diaz MD       OBJECTIVE:  BP (!) 108/56   Pulse 65   Temp 98.3 °F (36.8 °C) (Oral)   Resp 16   Ht 6' 1\" (1.854 m)   Wt 163 lb 4.8 oz (74.1 kg)   SpO2 96%   BMI 21.54 kg/m²   Temp  Av.6 °F (37 °C)  Min: 97.9 °F (36.6 °C)  Max: 99.9 °F (37.7 °C)  General appearance: Resting in bed in no apparent distress. Skin: Warm and dry. No rashes were noted. HEENT: Round and reactive pupils. Moist mucous membranes. No ulcerations or thrush. Neck: Supple to movements. Chest: No use of accessory muscles to breathe. Symmetrical expansion. No wheezing, crackles or rhonchi. Cardiovascular: Reguar rate and rhythm. No murmurs gallops, or rubs appreciated. Abdomen:  Bowel sounds present, nontender, nondistended, no masses or hepatosplenomegaly. Extremities: Right leg wrapped, less tenderness in calf  Lines: peripheral    I/O last 3 completed shifts: In: [place] [P.O.:240; I.V.:890]  Out: -       Laboratory and Tests Review:      Lab Results   Component Value Date    WBC 15.1 (H) 09/12/2020    WBC 15.7 (H) 09/11/2020    HGB 9.2 (L) 09/12/2020    HCT 29.3 (L) 09/12/2020    MCV 77.9 (L) 09/12/2020     09/12/2020     Lab Results   Component Value Date    NEUTROABS 12.24 (H) 09/12/2020    NEUTROABS 13.21 (H) 09/11/2020     No results found for: [facility]  Lab Results   Component Value Date    ALT 23 09/11/2020    AST 22 09/11/2020    ALKPHOS 119 09/11/2020    BILITOT 0.4 09/11/2020     Lab Results   Component Value Date     09/12/2020    K 3.6 09/12/2020    CL 98 09/12/2020    CO2 24 09/12/2020    BUN 11 09/12/2020    CREATININE 0.8 09/12/2020    CREATININE 0.8 09/11/2020    GFRAA >60 09/12/2020    LABGLOM >60 09/12/2020    GLUCOSE 128 09/12/2020    PROT 7.1 09/11/2020    LABALBU 3.7 09/11/2020    CALCIUM 8.4 09/12/2020    BILITOT 0.4 09/11/2020    ALKPHOS 119 09/11/2020    AST 22 09/11/2020    ALT 23 09/11/2020     Lab Results   Component Value Date    CRP 27.1 (H) 09/12/2020     Lab Results   Component Value Date    SEDRATE 82 (H) 09/12/2020       Radiology:    CT TIBIA FIBULA RIGHT WO CONTRAST   Final Result   Findings compatible with edema or cellulitis             US DUP LOWER EXTREMITY RIGHT SHAQ   Final Result   No evidence of right leg DVT               XR TIBIA FIBULA RIGHT (2 VIEWS)   Final Result   1. Subcutaneous edema left calf region. Soft tissue swelling surrounds the    ankle joint. Findings may be related to soft tissue cellulitis. Negative for    soft tissue gas. 2. Negative for acute fracture, dislocation. This report has been electronically signed by Idalia Kapoor MD.      XR CHEST PORTABLE   Final Result      No airspace opacities or pleural effusion.

## 2020-09-14 LAB
GRAM STAIN ORDERABLE: NORMAL
MRSA CULTURE ONLY: NORMAL

## 2020-09-14 NOTE — DISCHARGE SUMMARY
[facility]       Pre-service for Primary care physician  Please call [phone] to establish with a [facility] primary care Physician. THanks so much!!! Activity level: as bryant    Diet: No diet orders on file    Dispo:home     Condition at discharge: fair          Patient ID:  Lizbeth Pickett  [id number]  28 y.o.  1985    Admit date: 9/11/2020    Discharge date and time:  9/14/2020  5:03 PM    Admission Diagnoses: Principal Problem:    Cellulitis  Active Problems:    Heroin abuse ([address]    Tobacco abuse    Cellulitis of right leg    Sepsis ([facility] [address]    Hypokalemia  Resolved Problems:    * No resolved hospital problems. *      Discharge Diagnoses: Principal Problem:    Cellulitis  Active Problems:    Heroin abuse (HCC)    Tobacco abuse    Cellulitis of right leg    Sepsis ([address]    Hypokalemia  Resolved Problems:    * No resolved hospital problems. *    Sepsis  RLE cellulitis  Heroin abuse  Hypokalemia  Hyperglycemia      Consults:  PHARMACY TO DOSE VANCOMYCIN  IP CONSULT TO INFECTIOUS DISEASES  IP CONSULT TO IV TEAM  IP CONSULT TO GENERAL SURGERY  IP CONSULT TO IV TEAM    Procedures: Excisional debridement right lower extremity    Hospital Course: Patient was admitted with Cellulitis [L03.90]  Cellulitis [L03.90]. Patient is an unfortunate 51-year-old male who presents to [facility] who has been having fevers and worsening right leg swelling. He has also bilateral right hand swelling which is been chronic for him. Due to right leg swelling for which he came to ED for evaluation. There he was found to have a white count of 15,700. Concern for systemic infection was raised. X-rays show signs of cellulitis but no gas. Blood cultures were obtained. Due to his history of IV drug abuse he was admitted for IV antibiotics. Pt seen and examined by consultants. Pt had procedure as above and given iv abx.  Pt told nursing staff that he wanted to leave ama and subsequently left ama. On day of discharge/ama, pt denied to me fevers, chills,n/v.    Discharge Exam:  Vitals:    09/13/20 1145 09/13/20 1255 09/13/20 1605 09/13/20 2001   BP: (!) 104/51 (!) 108/56 (!) 102/50 134/62   Pulse: 66 65 66 83   Resp: 20 16 16 16   Temp:  98.3 °F (36.8 °C) 98.6 °F (37 °C) 97.8 °F (36.6 °C)   TempSrc:  Oral Oral Oral   SpO2: 96% 96%  100%   Weight:       Height:         Skin: warm and dry, no rash or erythema  Pulmonary/Chest: clear to auscultation bilaterally- no wheezes, rales or rhonchi, normal air movement, no respiratory distress  Cardiovascular: rhythm reg at rate of 68  Abdomen: soft, non-tender, non-distended, normal bowel sounds, no masses or organomegaly  Extremities: no cyanosis, no clubbing and no edema    I/O last 3 completed shifts: In: 2959 [P.O.:885; I.V.:1770; IV [id number]:589]  Out: 10 [Blood:10]  I/O this shift: In: 9180 [P.O.:1125; I.V.:2611; IV Piggyback:304]  Out: 10 [Blood:10]      LABS:  Recent Labs     09/12/20  1335      K 3.6   CL 98   CO2 24   BUN 11   CREATININE 0.8   GLUCOSE 128*   CALCIUM 8.4*       Recent Labs     09/12/20  1335   WBC 15.1*   RBC 3.76*   HGB 9.2*   HCT 29.3*   MCV 77.9*   MCH 24.5*   MCHC 31.4*   RDW 15.2*      MPV 10.4       No results for input(s): POCGLU in the last 72 hours. Imaging:   CT TIBIA FIBULA RIGHT WO CONTRAST   Final Result   Findings compatible with edema or cellulitis             US DUP LOWER EXTREMITY RIGHT SHAQ   Final Result   No evidence of right leg DVT               XR TIBIA FIBULA RIGHT (2 VIEWS)   Final Result   1. Subcutaneous edema left calf region. Soft tissue swelling surrounds the    ankle joint. Findings may be related to soft tissue cellulitis. Negative for    soft tissue gas. 2. Negative for acute fracture, dislocation.        This report has been electronically signed by Mitzy Anne MD.      XR CHEST PORTABLE   Final Result      No airspace opacities or pleural effusion.                 Patient Instructions:      Medication List      ASK your doctor about these medications    albuterol sulfate  (90 Base) MCG/ACT inhaler  Commonly known as:  ProAir HFA  Inhale 2 puffs into the lungs every 6 hours as needed for Wheezing                  Signed:  Electronically signed by Deena Estrada DO on 9/14/2020 at 5:03 PM

## 2020-09-14 NOTE — PROGRESS NOTES
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 Told pt that he could not go to his truck by himself and someone has to escort him. Pt agreed, called and notified clinical manager,called dispatch to have an officer escort the pt. Officer walked with patient to his truck, per  he did not turn on the light in the cabin. He was fidgety and reaching for something not letting the officer see. Pt was escorted back to his room.

## 2020-09-16 LAB
BLOOD CULTURE, ROUTINE: NORMAL
CULTURE SURGICAL: ABNORMAL
CULTURE, BLOOD 2: NORMAL
ORGANISM: ABNORMAL
ORGANISM: ABNORMAL

## 2020-09-18 LAB
ANAEROBIC CULTURE: ABNORMAL
ANAEROBIC CULTURE: ABNORMAL
ORGANISM: ABNORMAL

## 2020-10-19 LAB
FUNGUS (MYCOLOGY) CULTURE: NORMAL
FUNGUS STAIN: NORMAL

## 2020-11-03 LAB
AFB CULTURE (MYCOBACTERIA): NORMAL
AFB SMEAR: NORMAL

## 2024-03-24 ENCOUNTER — APPOINTMENT (OUTPATIENT)
Dept: CT IMAGING | Age: 39
DRG: 194 | End: 2024-03-24
Payer: MEDICAID

## 2024-03-24 ENCOUNTER — HOSPITAL ENCOUNTER (INPATIENT)
Age: 39
LOS: 1 days | Discharge: LEFT BEFORE DISCHARGE | DRG: 194 | End: 2024-03-25
Attending: EMERGENCY MEDICINE | Admitting: STUDENT IN AN ORGANIZED HEALTH CARE EDUCATION/TRAINING PROGRAM
Payer: MEDICAID

## 2024-03-24 ENCOUNTER — APPOINTMENT (OUTPATIENT)
Dept: GENERAL RADIOLOGY | Age: 39
DRG: 194 | End: 2024-03-24
Payer: MEDICAID

## 2024-03-24 DIAGNOSIS — J18.9 PNEUMONIA OF BOTH LUNGS DUE TO INFECTIOUS ORGANISM, UNSPECIFIED PART OF LUNG: ICD-10-CM

## 2024-03-24 DIAGNOSIS — K92.2 GASTROINTESTINAL HEMORRHAGE, UNSPECIFIED GASTROINTESTINAL HEMORRHAGE TYPE: ICD-10-CM

## 2024-03-24 DIAGNOSIS — J10.1 INFLUENZA B: Primary | ICD-10-CM

## 2024-03-24 DIAGNOSIS — D64.9 ANEMIA, UNSPECIFIED TYPE: ICD-10-CM

## 2024-03-24 DIAGNOSIS — E87.6 HYPOKALEMIA: ICD-10-CM

## 2024-03-24 LAB
ALBUMIN SERPL-MCNC: 3.4 G/DL (ref 3.5–5.2)
ALP SERPL-CCNC: 149 U/L (ref 40–129)
ALT SERPL-CCNC: 18 U/L (ref 0–40)
ANION GAP SERPL CALCULATED.3IONS-SCNC: 13 MMOL/L (ref 7–16)
AST SERPL-CCNC: 27 U/L (ref 0–39)
ATYPICAL LYMPHOCYTE ABSOLUTE COUNT: 0.42 K/UL (ref 0–0.46)
ATYPICAL LYMPHOCYTES: 3 % (ref 0–4)
BASOPHILS # BLD: 0 K/UL (ref 0–0.2)
BASOPHILS NFR BLD: 0 % (ref 0–2)
BILIRUB SERPL-MCNC: 0.4 MG/DL (ref 0–1.2)
BNP SERPL-MCNC: 179 PG/ML (ref 0–125)
BUN SERPL-MCNC: 9 MG/DL (ref 6–20)
CALCIUM SERPL-MCNC: 8.5 MG/DL (ref 8.6–10.2)
CHLORIDE SERPL-SCNC: 94 MMOL/L (ref 98–107)
CO2 SERPL-SCNC: 27 MMOL/L (ref 22–29)
CREAT SERPL-MCNC: 0.6 MG/DL (ref 0.7–1.2)
EOSINOPHIL # BLD: 0.14 K/UL (ref 0.05–0.5)
EOSINOPHILS RELATIVE PERCENT: 1 % (ref 0–6)
ERYTHROCYTE [DISTWIDTH] IN BLOOD BY AUTOMATED COUNT: 23.6 % (ref 11.5–15)
GFR SERPL CREATININE-BSD FRML MDRD: >60 ML/MIN/1.73M2
GLUCOSE SERPL-MCNC: 103 MG/DL (ref 74–99)
HCT VFR BLD AUTO: 23.2 % (ref 37–54)
HGB BLD-MCNC: 5.9 G/DL (ref 12.5–16.5)
INFLUENZA A BY PCR: NOT DETECTED
INFLUENZA B BY PCR: DETECTED
LACTATE BLDV-SCNC: 1.5 MMOL/L (ref 0.5–1.9)
LYMPHOCYTES NFR BLD: 1.54 K/UL (ref 1.5–4)
LYMPHOCYTES RELATIVE PERCENT: 10 % (ref 20–42)
MCH RBC QN AUTO: 14.2 PG (ref 26–35)
MCHC RBC AUTO-ENTMCNC: 25.4 G/DL (ref 32–34.5)
MCV RBC AUTO: 55.8 FL (ref 80–99.9)
MONOCYTES NFR BLD: 1.12 K/UL (ref 0.1–0.95)
MONOCYTES NFR BLD: 7 % (ref 2–12)
NEUTROPHILS NFR BLD: 80 % (ref 43–80)
NEUTS SEG NFR BLD: 12.58 K/UL (ref 1.8–7.3)
NUCLEATED RED BLOOD CELLS: 2 PER 100 WBC
PLATELET, FLUORESCENCE: 787 K/UL (ref 130–450)
PMV BLD AUTO: 9.6 FL (ref 7–12)
POTASSIUM SERPL-SCNC: 3.4 MMOL/L (ref 3.5–5)
PROT SERPL-MCNC: 8.1 G/DL (ref 6.4–8.3)
RBC # BLD AUTO: 4.16 M/UL (ref 3.8–5.8)
RBC # BLD: ABNORMAL 10*6/UL
SARS-COV-2 RDRP RESP QL NAA+PROBE: NOT DETECTED
SODIUM SERPL-SCNC: 134 MMOL/L (ref 132–146)
SPECIMEN DESCRIPTION: NORMAL
TROPONIN I SERPL HS-MCNC: 7 NG/L (ref 0–11)
WBC # BLD: ABNORMAL 10*3/UL
WBC OTHER # BLD: 15.8 K/UL (ref 4.5–11.5)

## 2024-03-24 PROCEDURE — 6360000002 HC RX W HCPCS: Performed by: EMERGENCY MEDICINE

## 2024-03-24 PROCEDURE — 2500000003 HC RX 250 WO HCPCS: Performed by: EMERGENCY MEDICINE

## 2024-03-24 PROCEDURE — 96375 TX/PRO/DX INJ NEW DRUG ADDON: CPT

## 2024-03-24 PROCEDURE — 6370000000 HC RX 637 (ALT 250 FOR IP): Performed by: EMERGENCY MEDICINE

## 2024-03-24 PROCEDURE — 6360000004 HC RX CONTRAST MEDICATION: Performed by: RADIOLOGY

## 2024-03-24 PROCEDURE — 99285 EMERGENCY DEPT VISIT HI MDM: CPT

## 2024-03-24 PROCEDURE — 83880 ASSAY OF NATRIURETIC PEPTIDE: CPT

## 2024-03-24 PROCEDURE — 71046 X-RAY EXAM CHEST 2 VIEWS: CPT

## 2024-03-24 PROCEDURE — 30233N1 TRANSFUSION OF NONAUTOLOGOUS RED BLOOD CELLS INTO PERIPHERAL VEIN, PERCUTANEOUS APPROACH: ICD-10-PCS | Performed by: EMERGENCY MEDICINE

## 2024-03-24 PROCEDURE — 94640 AIRWAY INHALATION TREATMENT: CPT

## 2024-03-24 PROCEDURE — 93005 ELECTROCARDIOGRAM TRACING: CPT | Performed by: NURSE PRACTITIONER

## 2024-03-24 PROCEDURE — 71275 CT ANGIOGRAPHY CHEST: CPT

## 2024-03-24 PROCEDURE — 83605 ASSAY OF LACTIC ACID: CPT

## 2024-03-24 PROCEDURE — 87635 SARS-COV-2 COVID-19 AMP PRB: CPT

## 2024-03-24 PROCEDURE — 86901 BLOOD TYPING SEROLOGIC RH(D): CPT

## 2024-03-24 PROCEDURE — 36415 COLL VENOUS BLD VENIPUNCTURE: CPT

## 2024-03-24 PROCEDURE — 85025 COMPLETE CBC W/AUTO DIFF WBC: CPT

## 2024-03-24 PROCEDURE — 84484 ASSAY OF TROPONIN QUANT: CPT

## 2024-03-24 PROCEDURE — 96365 THER/PROPH/DIAG IV INF INIT: CPT

## 2024-03-24 PROCEDURE — 2580000003 HC RX 258: Performed by: EMERGENCY MEDICINE

## 2024-03-24 PROCEDURE — 80053 COMPREHEN METABOLIC PANEL: CPT

## 2024-03-24 PROCEDURE — 86900 BLOOD TYPING SEROLOGIC ABO: CPT

## 2024-03-24 PROCEDURE — 86923 COMPATIBILITY TEST ELECTRIC: CPT

## 2024-03-24 PROCEDURE — 86850 RBC ANTIBODY SCREEN: CPT

## 2024-03-24 PROCEDURE — 87040 BLOOD CULTURE FOR BACTERIA: CPT

## 2024-03-24 PROCEDURE — 87502 INFLUENZA DNA AMP PROBE: CPT

## 2024-03-24 RX ORDER — PANTOPRAZOLE SODIUM 40 MG/10ML
40 INJECTION, POWDER, LYOPHILIZED, FOR SOLUTION INTRAVENOUS ONCE
Status: COMPLETED | OUTPATIENT
Start: 2024-03-24 | End: 2024-03-25

## 2024-03-24 RX ORDER — SODIUM CHLORIDE 9 MG/ML
INJECTION, SOLUTION INTRAVENOUS PRN
Status: DISCONTINUED | OUTPATIENT
Start: 2024-03-24 | End: 2024-03-25 | Stop reason: HOSPADM

## 2024-03-24 RX ORDER — POTASSIUM CHLORIDE 20 MEQ/1
20 TABLET, EXTENDED RELEASE ORAL ONCE
Status: COMPLETED | OUTPATIENT
Start: 2024-03-24 | End: 2024-03-24

## 2024-03-24 RX ORDER — IPRATROPIUM BROMIDE AND ALBUTEROL SULFATE 2.5; .5 MG/3ML; MG/3ML
1 SOLUTION RESPIRATORY (INHALATION) ONCE
Status: COMPLETED | OUTPATIENT
Start: 2024-03-24 | End: 2024-03-24

## 2024-03-24 RX ADMIN — POTASSIUM CHLORIDE 20 MEQ: 1500 TABLET, EXTENDED RELEASE ORAL at 23:02

## 2024-03-24 RX ADMIN — CEFTRIAXONE 1000 MG: 1 INJECTION, POWDER, FOR SOLUTION INTRAMUSCULAR; INTRAVENOUS at 23:05

## 2024-03-24 RX ADMIN — IOPAMIDOL 75 ML: 755 INJECTION, SOLUTION INTRAVENOUS at 22:24

## 2024-03-24 RX ADMIN — DOXYCYCLINE 100 MG: 100 INJECTION, POWDER, LYOPHILIZED, FOR SOLUTION INTRAVENOUS at 23:07

## 2024-03-24 RX ADMIN — IPRATROPIUM BROMIDE AND ALBUTEROL SULFATE 1 DOSE: 2.5; .5 SOLUTION RESPIRATORY (INHALATION) at 21:33

## 2024-03-24 ASSESSMENT — LIFESTYLE VARIABLES
HOW OFTEN DO YOU HAVE A DRINK CONTAINING ALCOHOL: NEVER
HOW MANY STANDARD DRINKS CONTAINING ALCOHOL DO YOU HAVE ON A TYPICAL DAY: PATIENT DOES NOT DRINK

## 2024-03-24 NOTE — ED PROVIDER NOTES
[facility] EMERGENCY DEPARTMENT  EMERGENCY DEPARTMENT ENCOUNTER        Pt Name: Adolfo Hebert  MRN: [id number]  Birthdate 1985  Date of evaluation: 3/24/2024  Provider: Viry Parker MD  PCP: No primary care provider on file.  Note Started: 6:17 PM EDT 3/24/24    CHIEF COMPLAINT       Chief Complaint   Patient presents with    Cough     Productive x4-5 days. congestion    Shortness of Breath       HISTORY OF PRESENT ILLNESS: 1 or more Elements   History From: Patient    Limitations to history : None    Adolfo Hebert is a 38 y.o. male who presents to the emergency department with complaints of cough and congestions with increasing shortness of breath for the past week.  Patient states that started with nasal congestion and postnasal drip.  He then developed a productive cough.  No hemoptysis.  He reports some pain with deep breathing.  He is a smoker.  He has a distant history of IV drug use but denies any IV drug use at this time.  He states he had chills at night but no fever.  Denies any leg pain or leg swelling.  Denies any nausea vomiting or diarrhea.  Denies any dyspnea on exertion.  No history of blood clots.  No known sick contacts.    Nursing Notes were all reviewed and agreed with or any disagreements were addressed in the HPI.      REVIEW OF EXTERNAL NOTE :       PDMP reviewed.    REVIEW OF SYSTEMS :           Positives and Pertinent negatives as per HPI.     SURGICAL HISTORY     Past Surgical History:   Procedure Laterality Date    LEG BIOPSY EXCISION N/A 9/13/2020    LEG LESION BIOPSY EXCISION performed by Lavell Dockery MD at [facility] OR       CURRENTMEDICATIONS       Previous Medications    ALBUTEROL (PROAIR HFA) 108 (90 BASE) MCG/ACT INHALER    Inhale 2 puffs into the lungs every 6 hours as needed for Wheezing       ALLERGIES     Patient has no known allergies.    FAMILYHISTORY     History reviewed. No pertinent family history.     SOCIAL HISTORY    use but denies any IV drug use at this time.  He states he had chills at night but no fever.    Physical exam findings: Lungs with scattered apical wheezes slightly decreased breath sounds left upper lobe and right midlung.  Mild rhonchi in left upper lobe.  No tachypnea no accessory muscles no retractions no rales not in respiratory distress.  Able to speak in full sentences.  No hypoxia    Differential diagnosis (includes but not limited to):  Pneumonia COVID influenza bronchitis COPD exacerbation PE ACS dysrhythmia dehydration STARLA      Chronic conditions:  has a past medical history of Heroin use.          ED Course Summary:(labs and imaging reviewed, interventions, reassessment, consults,shared decision making with patient, disposition)    EKG ordered to evaluate patient's current cardiac rate, rhythm, and QT interval. CBC is ordered to evaluate for any signs of infection or inflammation by obtaining a WBC count, or any signs of acute anemia by interpreting hemoglobin. CMP for any electrolyte imbalances, kidney function, hepatic injury or any elevations in anion gap. Troponin as a marker for myocardial ischemia or heart strain. Lactic acid as a marker of hypoperfusion or ischemia. Viral swabs to evaluate for viral etiology of symptoms. BNP to evaluate for heart failure and/or as a marker for heart strain. Chest x-ray for any possible signs of, but without limitation to, pneumonia, pleural effusions, cardiomegaly, pneumothorax, atelectasis, rib or sternal abnormalities including fractures. CT chest for, but without limitation to, pulmonary embolism, effusions, pneumonia, dissection or acute bony fractures.      Patient was given 1 DuoNeb treatments and 1 L of IV fluids.  Patient was given 40 mg of IV Protonix.  EKG shows normal sinus rhythm at 76 bpm no signs of ST changes no STEMI.  Patient hadHistory that was normal, other than slightly decreased potassium at 3.4He was given 20 meq oral replacement.  CBC showed

## 2024-03-24 NOTE — ED NOTES
Department of Emergency Medicine  FIRST PROVIDER TRIAGE NOTE             Independent MLP           3/24/24  4:05 PM EDT    Date of Encounter: 3/24/24   MRN: [id number]      HPI: Adolfo Hebert is a 38 y.o. male who presents to the ED for Cough (Productive x4-5 days. congestion)     WORSENING SOB, DIFFICULTY CATCHING HIS  BREATH AND COUGH.  DENIES FEVERS.      ROS: Negative for abd pain or back pain.    PE: Gen Appearance/Constitutional: alert  HEENT: NC/NT. PERRLA,  Airway patent.     Initial Plan of Care: All treatment areas with department are currently occupied. Plan to order/Initiate the following while awaiting opening in ED.  Initiate Treatment-Testing, Proceed toTreatment Area When Bed Available for ED Attending/MLP to Continue Care    Electronically signed by AL Kothari CNP   DD: 3/24/24      Fran Gannon APRN - CNP  03/24/24 7303

## 2024-03-25 ENCOUNTER — APPOINTMENT (OUTPATIENT)
Dept: CT IMAGING | Age: 39
DRG: 194 | End: 2024-03-25
Payer: MEDICAID

## 2024-03-25 VITALS
WEIGHT: 135 LBS | BODY MASS INDEX: 18.28 KG/M2 | OXYGEN SATURATION: 98 % | HEIGHT: 72 IN | SYSTOLIC BLOOD PRESSURE: 115 MMHG | RESPIRATION RATE: 16 BRPM | DIASTOLIC BLOOD PRESSURE: 66 MMHG | TEMPERATURE: 98.2 F | HEART RATE: 73 BPM

## 2024-03-25 PROBLEM — J18.9 MULTIFOCAL PNEUMONIA: Status: ACTIVE | Noted: 2024-03-25

## 2024-03-25 PROBLEM — J10.1 INFLUENZA B: Status: ACTIVE | Noted: 2024-03-25

## 2024-03-25 LAB
ALBUMIN SERPL-MCNC: 2.6 G/DL (ref 3.5–5.2)
ALP SERPL-CCNC: 126 U/L (ref 40–129)
ALT SERPL-CCNC: 13 U/L (ref 0–40)
ANION GAP SERPL CALCULATED.3IONS-SCNC: 9 MMOL/L (ref 7–16)
AST SERPL-CCNC: 18 U/L (ref 0–39)
BASOPHILS # BLD: 0.02 K/UL (ref 0–0.2)
BASOPHILS NFR BLD: 0 % (ref 0–2)
BILIRUB SERPL-MCNC: 0.5 MG/DL (ref 0–1.2)
BUN SERPL-MCNC: 7 MG/DL (ref 6–20)
CALCIUM SERPL-MCNC: 7.8 MG/DL (ref 8.6–10.2)
CHLORIDE SERPL-SCNC: 98 MMOL/L (ref 98–107)
CO2 SERPL-SCNC: 26 MMOL/L (ref 22–29)
CREAT SERPL-MCNC: 0.5 MG/DL (ref 0.7–1.2)
CRP SERPL HS-MCNC: 167 MG/L (ref 0–5)
EKG ATRIAL RATE: 76 BPM
EKG P AXIS: 61 DEGREES
EKG P-R INTERVAL: 138 MS
EKG Q-T INTERVAL: 444 MS
EKG QRS DURATION: 90 MS
EKG QTC CALCULATION (BAZETT): 499 MS
EKG R AXIS: 89 DEGREES
EKG T AXIS: 39 DEGREES
EKG VENTRICULAR RATE: 76 BPM
EOSINOPHIL # BLD: 0.03 K/UL (ref 0.05–0.5)
EOSINOPHILS RELATIVE PERCENT: 0 % (ref 0–6)
ERYTHROCYTE [DISTWIDTH] IN BLOOD BY AUTOMATED COUNT: 30.3 % (ref 11.5–15)
ERYTHROCYTE [SEDIMENTATION RATE] IN BLOOD BY WESTERGREN METHOD: 65 MM/HR (ref 0–15)
GFR SERPL CREATININE-BSD FRML MDRD: >60 ML/MIN/1.73M2
GLUCOSE SERPL-MCNC: 111 MG/DL (ref 74–99)
HCT VFR BLD AUTO: 22.5 % (ref 37–54)
HGB BLD-MCNC: 6.3 G/DL (ref 12.5–16.5)
IMM GRANULOCYTES # BLD AUTO: 0.1 K/UL (ref 0–0.58)
IMM GRANULOCYTES NFR BLD: 1 % (ref 0–5)
LYMPHOCYTES NFR BLD: 1.89 K/UL (ref 1.5–4)
LYMPHOCYTES RELATIVE PERCENT: 14 % (ref 20–42)
MCH RBC QN AUTO: 16.8 PG (ref 26–35)
MCHC RBC AUTO-ENTMCNC: 28 G/DL (ref 32–34.5)
MCV RBC AUTO: 60 FL (ref 80–99.9)
MONOCYTES NFR BLD: 15 % (ref 2–12)
MONOCYTES NFR BLD: 2.07 K/UL (ref 0.1–0.95)
NEUTROPHILS NFR BLD: 71 % (ref 43–80)
NEUTS SEG NFR BLD: 9.92 K/UL (ref 1.8–7.3)
PLATELET, FLUORESCENCE: 611 K/UL (ref 130–450)
PMV BLD AUTO: 9.9 FL (ref 7–12)
POTASSIUM SERPL-SCNC: 4 MMOL/L (ref 3.5–5)
PROCALCITONIN SERPL-MCNC: 0.13 NG/ML (ref 0–0.08)
PROT SERPL-MCNC: 6.5 G/DL (ref 6.4–8.3)
RBC # BLD AUTO: 3.75 M/UL (ref 3.8–5.8)
RBC # BLD: ABNORMAL 10*6/UL
SODIUM SERPL-SCNC: 133 MMOL/L (ref 132–146)
WBC OTHER # BLD: 14 K/UL (ref 4.5–11.5)

## 2024-03-25 PROCEDURE — 84145 PROCALCITONIN (PCT): CPT

## 2024-03-25 PROCEDURE — 87389 HIV-1 AG W/HIV-1&-2 AB AG IA: CPT

## 2024-03-25 PROCEDURE — 6360000002 HC RX W HCPCS: Performed by: STUDENT IN AN ORGANIZED HEALTH CARE EDUCATION/TRAINING PROGRAM

## 2024-03-25 PROCEDURE — 94664 DEMO&/EVAL PT USE INHALER: CPT

## 2024-03-25 PROCEDURE — 36430 TRANSFUSION BLD/BLD COMPNT: CPT

## 2024-03-25 PROCEDURE — 94669 MECHANICAL CHEST WALL OSCILL: CPT

## 2024-03-25 PROCEDURE — 6370000000 HC RX 637 (ALT 250 FOR IP): Performed by: EMERGENCY MEDICINE

## 2024-03-25 PROCEDURE — 99223 1ST HOSP IP/OBS HIGH 75: CPT | Performed by: STUDENT IN AN ORGANIZED HEALTH CARE EDUCATION/TRAINING PROGRAM

## 2024-03-25 PROCEDURE — 86701 HIV-1ANTIBODY: CPT

## 2024-03-25 PROCEDURE — 86702 HIV-2 ANTIBODY: CPT

## 2024-03-25 PROCEDURE — 99234 HOSP IP/OBS SM DT SF/LOW 45: CPT | Performed by: INTERNAL MEDICINE

## 2024-03-25 PROCEDURE — 1200000000 HC SEMI PRIVATE

## 2024-03-25 PROCEDURE — 96375 TX/PRO/DX INJ NEW DRUG ADDON: CPT

## 2024-03-25 PROCEDURE — 6360000002 HC RX W HCPCS: Performed by: EMERGENCY MEDICINE

## 2024-03-25 PROCEDURE — 80053 COMPREHEN METABOLIC PANEL: CPT

## 2024-03-25 PROCEDURE — 85025 COMPLETE CBC W/AUTO DIFF WBC: CPT

## 2024-03-25 PROCEDURE — 2500000003 HC RX 250 WO HCPCS

## 2024-03-25 PROCEDURE — 2580000003 HC RX 258: Performed by: STUDENT IN AN ORGANIZED HEALTH CARE EDUCATION/TRAINING PROGRAM

## 2024-03-25 PROCEDURE — 6370000000 HC RX 637 (ALT 250 FOR IP)

## 2024-03-25 PROCEDURE — C9113 INJ PANTOPRAZOLE SODIUM, VIA: HCPCS | Performed by: EMERGENCY MEDICINE

## 2024-03-25 PROCEDURE — 86140 C-REACTIVE PROTEIN: CPT

## 2024-03-25 PROCEDURE — 85652 RBC SED RATE AUTOMATED: CPT

## 2024-03-25 PROCEDURE — P9016 RBC LEUKOCYTES REDUCED: HCPCS

## 2024-03-25 PROCEDURE — 2580000003 HC RX 258

## 2024-03-25 PROCEDURE — 74176 CT ABD & PELVIS W/O CONTRAST: CPT

## 2024-03-25 PROCEDURE — 93010 ELECTROCARDIOGRAM REPORT: CPT | Performed by: INTERNAL MEDICINE

## 2024-03-25 PROCEDURE — 2580000003 HC RX 258: Performed by: EMERGENCY MEDICINE

## 2024-03-25 RX ORDER — IPRATROPIUM BROMIDE AND ALBUTEROL SULFATE 2.5; .5 MG/3ML; MG/3ML
1 SOLUTION RESPIRATORY (INHALATION)
Status: DISCONTINUED | OUTPATIENT
Start: 2024-03-25 | End: 2024-03-25 | Stop reason: HOSPADM

## 2024-03-25 RX ORDER — ALBUTEROL SULFATE 2.5 MG/3ML
2.5 SOLUTION RESPIRATORY (INHALATION) EVERY 6 HOURS PRN
Status: DISCONTINUED | OUTPATIENT
Start: 2024-03-25 | End: 2024-03-25 | Stop reason: HOSPADM

## 2024-03-25 RX ORDER — POTASSIUM CHLORIDE 20 MEQ/1
40 TABLET, EXTENDED RELEASE ORAL PRN
Status: DISCONTINUED | OUTPATIENT
Start: 2024-03-25 | End: 2024-03-25 | Stop reason: HOSPADM

## 2024-03-25 RX ORDER — ACETAMINOPHEN 325 MG/1
650 TABLET ORAL EVERY 6 HOURS PRN
Status: DISCONTINUED | OUTPATIENT
Start: 2024-03-25 | End: 2024-03-25 | Stop reason: HOSPADM

## 2024-03-25 RX ORDER — ONDANSETRON 2 MG/ML
4 INJECTION INTRAMUSCULAR; INTRAVENOUS EVERY 6 HOURS PRN
Status: DISCONTINUED | OUTPATIENT
Start: 2024-03-25 | End: 2024-03-25 | Stop reason: HOSPADM

## 2024-03-25 RX ORDER — IPRATROPIUM BROMIDE AND ALBUTEROL SULFATE 2.5; .5 MG/3ML; MG/3ML
1 SOLUTION RESPIRATORY (INHALATION) EVERY 4 HOURS PRN
Status: DISCONTINUED | OUTPATIENT
Start: 2024-03-25 | End: 2024-03-25

## 2024-03-25 RX ORDER — 0.9 % SODIUM CHLORIDE 0.9 %
1000 INTRAVENOUS SOLUTION INTRAVENOUS ONCE
Status: COMPLETED | OUTPATIENT
Start: 2024-03-25 | End: 2024-03-25

## 2024-03-25 RX ORDER — SODIUM CHLORIDE 0.9 % (FLUSH) 0.9 %
5-40 SYRINGE (ML) INJECTION PRN
Status: DISCONTINUED | OUTPATIENT
Start: 2024-03-25 | End: 2024-03-25 | Stop reason: HOSPADM

## 2024-03-25 RX ORDER — SODIUM CHLORIDE 0.9 % (FLUSH) 0.9 %
5-40 SYRINGE (ML) INJECTION EVERY 12 HOURS SCHEDULED
Status: DISCONTINUED | OUTPATIENT
Start: 2024-03-25 | End: 2024-03-25 | Stop reason: HOSPADM

## 2024-03-25 RX ORDER — PANTOPRAZOLE SODIUM 40 MG/10ML
40 INJECTION, POWDER, LYOPHILIZED, FOR SOLUTION INTRAVENOUS EVERY 6 HOURS
Status: DISCONTINUED | OUTPATIENT
Start: 2024-03-25 | End: 2024-03-25 | Stop reason: HOSPADM

## 2024-03-25 RX ORDER — ACETAMINOPHEN 650 MG/1
650 SUPPOSITORY RECTAL EVERY 6 HOURS PRN
Status: DISCONTINUED | OUTPATIENT
Start: 2024-03-25 | End: 2024-03-25 | Stop reason: HOSPADM

## 2024-03-25 RX ORDER — DICYCLOMINE HYDROCHLORIDE 10 MG/1
10 CAPSULE ORAL ONCE
Status: DISCONTINUED | OUTPATIENT
Start: 2024-03-25 | End: 2024-03-25 | Stop reason: HOSPADM

## 2024-03-25 RX ORDER — SODIUM CHLORIDE 9 MG/ML
INJECTION, SOLUTION INTRAVENOUS PRN
Status: DISCONTINUED | OUTPATIENT
Start: 2024-03-25 | End: 2024-03-25 | Stop reason: HOSPADM

## 2024-03-25 RX ORDER — MAGNESIUM SULFATE IN WATER 40 MG/ML
2000 INJECTION, SOLUTION INTRAVENOUS PRN
Status: DISCONTINUED | OUTPATIENT
Start: 2024-03-25 | End: 2024-03-25 | Stop reason: HOSPADM

## 2024-03-25 RX ORDER — ONDANSETRON 4 MG/1
4 TABLET, ORALLY DISINTEGRATING ORAL EVERY 8 HOURS PRN
Status: DISCONTINUED | OUTPATIENT
Start: 2024-03-25 | End: 2024-03-25 | Stop reason: HOSPADM

## 2024-03-25 RX ORDER — POTASSIUM CHLORIDE 7.45 MG/ML
10 INJECTION INTRAVENOUS PRN
Status: DISCONTINUED | OUTPATIENT
Start: 2024-03-25 | End: 2024-03-25 | Stop reason: HOSPADM

## 2024-03-25 RX ORDER — FENTANYL CITRATE 50 UG/ML
50 INJECTION, SOLUTION INTRAMUSCULAR; INTRAVENOUS ONCE
Status: COMPLETED | OUTPATIENT
Start: 2024-03-25 | End: 2024-03-25

## 2024-03-25 RX ORDER — OSELTAMIVIR PHOSPHATE 75 MG/1
75 CAPSULE ORAL 2 TIMES DAILY
Status: DISCONTINUED | OUTPATIENT
Start: 2024-03-25 | End: 2024-03-25 | Stop reason: HOSPADM

## 2024-03-25 RX ORDER — POLYETHYLENE GLYCOL 3350 17 G/17G
17 POWDER, FOR SOLUTION ORAL DAILY PRN
Status: DISCONTINUED | OUTPATIENT
Start: 2024-03-25 | End: 2024-03-25 | Stop reason: HOSPADM

## 2024-03-25 RX ADMIN — FENTANYL CITRATE 50 MCG: 50 INJECTION INTRAMUSCULAR; INTRAVENOUS at 02:46

## 2024-03-25 RX ADMIN — IPRATROPIUM BROMIDE AND ALBUTEROL SULFATE 1 DOSE: 2.5; .5 SOLUTION RESPIRATORY (INHALATION) at 07:57

## 2024-03-25 RX ADMIN — OSELTAMIVIR PHOSPHATE 75 MG: 75 CAPSULE ORAL at 10:00

## 2024-03-25 RX ADMIN — SODIUM CHLORIDE, PRESERVATIVE FREE 10 ML: 5 INJECTION INTRAVENOUS at 10:06

## 2024-03-25 RX ADMIN — SODIUM CHLORIDE 1000 ML: 9 INJECTION, SOLUTION INTRAVENOUS at 00:20

## 2024-03-25 RX ADMIN — PANTOPRAZOLE SODIUM 40 MG: 40 INJECTION, POWDER, FOR SOLUTION INTRAVENOUS at 00:19

## 2024-03-25 RX ADMIN — OSELTAMIVIR PHOSPHATE 75 MG: 75 CAPSULE ORAL at 00:26

## 2024-03-25 RX ADMIN — DOXYCYCLINE 100 MG: 100 INJECTION, POWDER, LYOPHILIZED, FOR SOLUTION INTRAVENOUS at 10:05

## 2024-03-25 NOTE — H&P
[facility] History and Physical      CHIEF COMPLAINT: Cough and shortness of breath    History of Present Illness: 38-year-old gentleman with past medical history of drug abuse presents for evaluation of cough congestion and shortness of breath for 1 week.  He stated that his symptoms started as congestion and postnasal drip which worsened gradually over the last 1 week.  He started developing cough productive of whitish to yellowish phlegm, no blood, with worsening shortness of breath on minimal exertion.  Patient is a current smoker with current IV drug abuse, he took heroin few days back.  Denies any chest pain, belly pain, nausea vomiting diarrhea, lightheadedness or dizziness, no fever or chills.    Informant(s) for H&P: Patient    REVIEW OF SYSTEMS:  A comprehensive review of systems was negative except for: what is in the HPI      PMH:  Past Medical History:   Diagnosis Date    Heroin use        Surgical History:  Past Surgical History:   Procedure Laterality Date    LEG BIOPSY EXCISION N/A 9/13/2020    LEG LESION BIOPSY EXCISION performed by Lavell Dockery MD at [facility] OR       Medications Prior to Admission:    Prior to Admission medications    Medication Sig Start Date End Date Taking? Authorizing Provider   albuterol (PROAIR HFA) 108 (90 BASE) MCG/ACT inhaler Inhale 2 puffs into the lungs every 6 hours as needed for Wheezing 4/27/15 5/4/15  Rainer Riddle MD       Allergies:    Patient has no known allergies.    Social History:    reports that he has been smoking. He has never used smokeless tobacco. He reports current drug use.    Family History:   family history is not on file.       PHYSICAL EXAM:  Vitals:  BP (!) 121/56   Pulse 85   Temp 98 °F (36.7 °C) (Oral)   Resp 18   Ht 1.829 m (6')   Wt 61.2 kg (135 lb)   SpO2 96%   BMI 18.31 kg/m²     General Appearance: alert and oriented to person, place and time and in no acute distress, pale and sick looking.  Skin: warm and  pneumonia.  Recommend radiographic   follow-up in 6-8 weeks to ensure resolution.   2. Hyperinflated lungs.             EKG: Accelerated junctional rhythm, reviewed by me independently    ASSESSMENT:      Active Problems:    * No active hospital problems. *  Resolved Problems:    * No resolved hospital problems. *      PLAN:    1.  Multifocal pneumonia-CT and x-ray suggestive of multifocal pneumonia, with past IV drug abuse history need testing for HIV, leukocytosis at 15.8, antibiotic Rocephin Doxy started in ED, will continue, procalcitonin level, inflammatory markers including sed rate and CRP, saturating well at room air, keep oxygen by nasal cannula to keep saturation more than 92%, consult pulmonology, repeat labs in morning and follow.  Telemetry monitoring.  2.  Influenza B-Tamiflu, breathing treatments, telemetry monitoring and follow.  3.  Severe anemia-hemoglobin 5.9, stool positive for blood, GI consult, n.p.o. after midnight, transfused in ED, repeat H&H after transfusion, transfuse to keep hemoglobin more than 7.    4.  Hypokalemia-replaced, recheck in morning.  5.  IV drug abuse-drug screen, as per history he took heroin few days back.    Code Status: Full code  DVT prophylaxis: Bilateral SCDs  PUD prophylaxis-Protonix      45 minutes or more were spent in assessing patient, reviewing charts, discussing plan of care and documentation.      NOTE: This report was transcribed using voice recognition software. Every effort was made to ensure accuracy; however, inadvertent computerized transcription errors may be present.  Electronically signed by Aman Petty MD on 3/25/2024 at 12:10 AM

## 2024-03-25 NOTE — ED NOTES
Notified dr garcia of patients request to leave [facility]- encouraged patient to stay and complete treatment but insist on leaving. Roland form printed and patient to be discharged- rn encouraged f/u for low hemoglobin and gi bleed possibility

## 2024-03-25 NOTE — ED NOTES
ED to Inpatient Handoff Report    Notified Kavitha that electronic handoff available and patient ready for transport to room 619.    Safety Risks: None identified    Patient in Restraints: no    Constant Observer or Patient : no    Telemetry Monitoring Ordered :Yes           Order to transfer to unit without monitor:NO    Last MEWS: 1 Time completed: 1242    Deterioration Index Score:   Predictive Model Details          17 (Normal)  Factor Value    Calculated 3/25/2024 12:42 41% Age 38 years old    Deterioration Index Model 25% WBC count abnormal (14.0 k/uL)     20% Hematocrit abnormal (22.5 %)     8% Sodium 133 mmol/L     5% Systolic 119     1% Pulse oximetry 98 %     1% Potassium 4.0 mmol/L     0% Respiratory rate 16     0% Temperature 98.2 °F (36.8 °C)     0% Pulse 73        Vitals:    03/25/24 0303 03/25/24 0530 03/25/24 0630 03/25/24 0758   BP: 132/64 128/71 119/64    Pulse: 77 80 71 73   Resp: 21 15 14 16   Temp: 98.2 °F (36.8 °C) 98.2 °F (36.8 °C)     TempSrc:       SpO2: 97% 98% 98%    Weight:       Height:             Opportunity for questions and clarification was provided.

## 2024-03-25 NOTE — DISCHARGE INSTR - COC
Continuity of Care Form    Patient Name: Adolfo Hebert   :  1985  MRN:  [id number]    Admit date:  3/24/2024  Discharge date:  ***    Code Status Order: Full Code   Advance Directives:     Admitting Physician:  Aman Petty MD  PCP: No primary care provider on file.    Discharging Nurse: ***  Discharging Hospital Unit/Room#: 15/15  Discharging Unit Phone Number: ***    Emergency Contact:   Extended Emergency Contact Information  Primary Emergency Contact: Isis Euceda \"HECTOR\"  Address: [address] [place] [address]  Home Phone: [phone]  Mobile Phone: [phone]  Relation: Parent  Preferred language: English   needed? No  Secondary Emergency Contact: Tiago Hebert  Address: [address] [place] [address]  Work Phone: [phone]  Relation: Parent    Past Surgical History:  Past Surgical History:   Procedure Laterality Date    LEG BIOPSY EXCISION N/A 2020    LEG LESION BIOPSY EXCISION performed by Lavell Dockery MD at [facility] OR       Immunization History:     There is no immunization history on file for this patient.    Active Problems:  Patient Active Problem List   Diagnosis Code    Cellulitis L03.90    Heroin abuse ([facility]) F11.10    Tobacco abuse Z72.0    Cellulitis of right leg L03.115    Sepsis ([facility]) A41.9    Hypokalemia E87.6    Multifocal pneumonia J18.9       Isolation/Infection:   Isolation            No Isolation          Patient Infection Status       Infection Onset Added Last Indicated Last Indicated By Review Planned Expiration Resolved Resolved By    Influenza 24 Influenza A+B, PCR 24                         Nurse Assessment:  Last Vital Signs: /66   Pulse 73   Temp 98.2 °F (36.8 °C)   Resp 16   Ht 1.829 m (6')   Wt 61.2 kg (135 lb)   SpO2 98%   BMI 18.31 kg/m²     Last documented pain score (0-10 scale):    Last Weight:   Wt Readings  from Last 1 Encounters:   03/24/24 61.2 kg (135 lb)     Mental Status:  {IP PT MENTAL STATUS:20030}    IV Access:  { IBRAHIMA IV ACCESS:785383788}    Nursing Mobility/ADLs:  Walking   {CHP DME ADLs:122927650}  Transfer  {CHP DME ADLs:944270665}  Bathing  {CHP DME ADLs:517897505}  Dressing  {CHP DME ADLs:446103362}  Toileting  {CHP DME ADLs:622979105}  Feeding  {CHP DME ADLs:690079079}  Med Admin  {CHP DME ADLs:208347566}  Med Delivery   { IBRAHIMA MED Delivery:154133061}    Wound Care Documentation and Therapy:  Wound 09/11/20 Hand Anterior;Right (Active)   Number of days: 1291       Wound 09/11/20 Finger (Comment which one) Anterior;Right 1.5cmx0.5cm (Active)   Number of days: 1291       Wound 09/11/20 Finger (Comment which one) Anterior;Right 1cm x1cm (Active)   Number of days: 1291       Wound 09/11/20 Finger (Comment which one) Anterior;Left 0.5x 0.5 (Active)   Number of days: 1291       Wound 09/11/20 Finger (Comment which one) Anterior;Left 1 cm x1cm (Active)   Number of days: 1291       Wound 09/11/20 Toe (Comment  which one) Anterior;Right 2 cm x1.5 cm (Active)   Number of days: 1291       Wound 09/11/20 Foot Anterior;Left 3.3cm x 3.5cm (Active)   Number of days: 1291       Wound 09/11/20 Thigh Anterior;Right (Active)   Number of days: 1291       Incision 09/13/20 Thigh Right (Active)   Number of days: 1289        Elimination:  Continence:   Bowel: {YES / NO:19727}  Bladder: {YES / NO:19727}  Urinary Catheter: {Urinary Catheter:237075341}   Colostomy/Ileostomy/Ileal Conduit: {YES / NO:19727}       Date of Last BM: ***    Intake/Output Summary (Last 24 hours) at 3/25/2024 1355  Last data filed at 3/25/2024 1105  Gross per 24 hour   Intake 108.54 ml   Output --   Net 108.54 ml     No intake/output data recorded.    Safety Concerns:     { IBRAHIMA Safety Concerns:527972725}    Impairments/Disabilities:      { IBRAHIMA Impairments/Disabilities:670841326}    Nutrition Therapy:  Current Nutrition Therapy:   { IBRAHIMA Diet

## 2024-03-25 NOTE — CONSULTS
CONSULT  Cheng Montenegro M.D.  [facility]  Dr. Kinga Sneed M.D.,  Dr. Kodak See M.D.,  YOLANDE DiehlO.,  Dr. Desmond Andrade D.O. ,  Dr. Pedro Wilder M.D.,          Adolfo TOÑO Hebert  38 y.o.  male      Re: \"GI bleed\"  Requesting physician: Dr. ZENOBIA Petty  Date:7:53 AM 3/25/2024          HPI: 38-year-old male patient seen in the hospital for above described issue.  Patient has presented initially to the emergency department because of cough and increasing shortness of breath.  Patient reports cough for approximately 1 week or so.  He reports also worsening shortness of breath.  Patient admits to smoking a pack of cigarettes per day.  Patient denies abdominal pain.  He denies nausea vomiting.  He denies hematemesis emesis of coffee-ground material.  He denies diarrhea.  Patient reports being slightly constipated recently.  He denies blood in the stool.  Patient denies black or tarry stool.  Patient has not found/noticed any signs of bleeding.  Patient admits to losing some weight approximately 5 to 10 pounds recently.  Upon presentation to the emergency department patient was noted to be anemic with hemoglobin of 5.9 compared to hemoglobin of 9.2 in 2020.  MCV 60 and hematocrit is 22.5.  Patient has been transfused 2 units of PRBC pending repeat H&H after the second unit.  Unremarkable liver profile and unremarkable chemistry today    Information sources:   -Patient  -medical record  -health care team    PMHx:  Past Medical History:   Diagnosis Date    Heroin use        PSHx:  Past Surgical History:   Procedure Laterality Date    LEG BIOPSY EXCISION N/A 9/13/2020    LEG LESION BIOPSY EXCISION performed by Lavell Dockery MD at [facility] OR       Meds:  Current Facility-Administered Medications   Medication Dose Route Frequency Provider Last Rate Last Admin    oseltamivir (TAMIFLU) capsule 75 mg  75 mg Oral BID Viry Parker MD   75 mg at 03/25/24 0026    sodium chloride flush 0.9 % injection

## 2024-03-25 NOTE — PROGRESS NOTES
Database initiated. Patient is A&O independent from home with significant other. States he uses no assistive devices and is RA at baseline. Patient was unaware he was being admitted. States he was told this morning by the GI group that they could do a scope as an outpatient. His RN is aware and is going to get the plan of care straightened out. Patient is unsure if he wants to stay for admission or not.

## 2024-03-25 NOTE — ED NOTES
Spoke with dr garcia- notified patient refused egd- and chose to have out patient. Patient educated on need to have and risk not having. Request advance on diet-patient is hungry- new order received and noted.  Patient refused Protonix-stating it made him feel worse. Patient has no pain at this time, talked about leaving [facility]- patient educated on need to complete treatment and at this time allows rn to start antibiotics iv.

## 2024-03-25 NOTE — CONSENT
Informed Consent for Blood Component Transfusion Note    I have discussed with the patient the rationale for blood component transfusion; its benefits in treating or preventing fatigue, organ damage, or death; and its risk which includes mild transfusion reactions, rare risk of blood borne infection, or more serious but rare reactions. I have discussed the alternatives to transfusion, including the risk and consequences of not receiving transfusion. The patient had an opportunity to ask questions and had agreed to proceed with transfusion of blood components.    Electronically signed by Reginaldo Barker MD on 3/25/24 at 12:49 PM EDT

## 2024-03-25 NOTE — DISCHARGE SUMMARY
[facility] Hospitalist Physician Discharge Summary       No follow-up provider specified.    Activity level: As tolerated     Dispo: LEFT AMA        Patient ID:  Adolfo Hebert  [id number]  38 y.o.  1985    Admit date: 3/24/2024    Discharge date and time:  3/25/2024  4:17 PM    Admission Diagnoses: Principal Problem:    Multifocal pneumonia  Resolved Problems:    * No resolved hospital problems. *      Discharge Diagnoses: Principal Problem:    Multifocal pneumonia  Resolved Problems:    * No resolved hospital problems. *      Consults:  IP CONSULT TO IV TEAM  IP CONSULT TO GI  IP CONSULT TO PULMONOLOGY  IP CONSULT TO INFECTIOUS DISEASES    Hospital Course:   Patient Adolfo Hebert is a 38 y.o. presented with Multifocal pneumonia [J18.9]    Patient is a 38-year-old male who was admitted due to cough and shortness of breath.  Noted to have multifocal pneumonia on CT.  Started on Rocephin and doxycycline.  Also was positive for flu B and was started on Tamiflu.  Concern for GI bleed given hemoglobin of 5.9 on admission.  Occult stool positive.  Received 2 units PRBCs.  Seen by GI, refused EGD at this time.  Saw patient and discussed case in details with him, he was agreeable with the plan and to stay for at least 1 day to monitor his hemoglobin.  Later, I received a call from RN that he wants to leave AMA.  Patient signed AMA papers and left.    Discharge Exam:    General Appearance: alert and oriented to person, place and time and in no acute distress  Skin: warm and dry  Head: normocephalic and atraumatic  Eyes: pupils equal, round, and reactive to light, extraocular eye movements intact, conjunctivae normal  Neck: neck supple and non tender without mass   Pulmonary/Chest: clear to auscultation bilaterally- no wheezes, rales or rhonchi, normal air movement, no respiratory distress  Cardiovascular: normal rate, normal S1 and S2 and no carotid bruits  Abdomen: soft, non-tender, non-distended,  is soft tissue prominence of the anterior mediastinum. There appear to be prominent mediastinal and hilar lymph nodes.  Normal appearance of the thoracic aorta and arch vessels.  Heart chambers are not enlarged.  No pericardial effusion.  Normal course and appearance of the esophagus. Lungs/pleura: Dense consolidations versus masses in the posterior left upper lung series 301, image 30 measures 3.9 cm.  This extends caudad in the left upper lung.  Similar appearing dense consolidation in the medial left upper lung series 301, image 59.  This measures 6.1 cm.  Dense consolidation in the left lower lung series 301, image 98 is poorly defined.  Similar dense consolidation in the right upper lung anteriorly on series 301, image 82 measures 3.9 cm and in the right middle lobe series 301, image 105 measuring 6.8 cm.  There are additional scattered areas of consolidation in the bilateral lungs.  No pleural effusion.  No pneumothorax.  No cystic lung parenchymal changes seen.  No bronchiectasis. Upper Abdomen: The imaged upper abdomen appears unremarkable. Soft Tissues/Bones: The thyroid gland and remaining soft tissue structures of the neck appear unremarkable.  No suspicious axillary lymph nodes.  The anterior and posterior chest wall is unremarkable.  Vertebral body height and alignment is maintained.  No destructive lytic or blastic changes.     1.  Multifocal areas of dense consolidation in all lobes of the bilateral lungs.  Presumably these represent multifocal infectious/inflammatory processes given the provided history.  Underlying mass cannot be entirely excluded at this time.  Please see recommendations below. 2.  There are no pleural effusions or pneumothoraces. 3.  There appear to be prominent mediastinal and hilar lymph nodes. 4.  No evidence of pulmonary embolism.  No evidence of right heart strain. 5.  Remainder of the study is as above. RECOMMENDATIONS: Recommend short interval follow-up CT scan of the chest

## 2024-03-25 NOTE — CONSULTS
Miguel Angel Thomas M.D.,[facility]  Chemo Metcalf D.O., BRANNON., [facility]  Leonarda Grimes M.D.  Dena Ryder M.D.   Dashawn Anderson D.O.      Patient:  Adolfo Hebert 38 y.o. male MRN: [id number]           PULMONARY CONSULTATION    Reason for Consultation: multifocal pneumonia  Referring Physician: Aman Petty MD    Communication with the referring physician will be sent via the electronic medical record.    Chief Complaint: Cough and shortness of breath     CODE STATUS: FULL CODE    SUBJECTIVE:  HPI:  Adolfo Hebert is a 38 y.o. male not previously known to our service with a PMH of IVDU and nicotine dependence we were asked to see for multifocal pneumonia.    Adolfo was having cough, congestion and shortness of breath for about a week.  It started as what he felt was sinus drainage which continued to progress.  He does report green/yellow mucous production but denies any fevers or chills.  Work-up in the ED showed profound anemia with a hemoglobin of 5.9, leukocytosis with a WBC of 15.8 and he was positive for Influenza B.  Chest imaging does show multifocal pneumonia with prominent mediastinal and hilar lymph nodes.     Adolfo is not requiring supplemental oxygen but does have a 20-pack year smoking history.    Adolfo was seen today in the ED lying on a cart.  He is not in any distress.  He has had 2 units PRBC for the anemia.  He denies any hemoptysis.  Lungs on exam exhibit rhonchi.      Past Medical History:   Diagnosis Date    Heroin use        Past Surgical History:   Procedure Laterality Date    LEG BIOPSY EXCISION N/A 9/13/2020    LEG LESION BIOPSY EXCISION performed by Lavell Dockery MD at [facility] OR       History reviewed. No pertinent family history.    Social History:   Social History     Socioeconomic History    Marital status: Single     Spouse name: Not on file    Number of children: Not on file    Years of education: Not on file    Highest education level: Not on file   Occupational History     Not on file   Tobacco Use    Smoking status: Every Day     Current packs/day: 1.00     Types: Cigarettes    Smokeless tobacco: Never   Vaping Use    Vaping Use: Never used   Substance and Sexual Activity    Alcohol use: Not on file    Drug use: Yes     Comment: heroin    Sexual activity: Yes     Partners: Male   Other Topics Concern    Not on file   Social History Narrative    Not on file     Social Determinants of Health     Financial Resource Strain: Not on file   Food Insecurity: Not on file   Transportation Needs: Not on file   Physical Activity: Not on file   Stress: Not on file   Social Connections: Not on file   Intimate Partner Violence: Not on file   Housing Stability: Not on file     Smoking history: The patient is a current cigarette smoker with a 20-pack year history     ETOH:   has no history on file for alcohol use.    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     Home Meds: Not in a hospital admission.    CURRENT MEDS :  Scheduled Meds:   oseltamivir  75 mg Oral BID    sodium chloride flush  5-40 mL IntraVENous 2 times per day    dicyclomine  10 mg Oral Once    pantoprazole  40 mg IntraVENous Q6H    cefTRIAXone (ROCEPHIN) IV  1,000 mg IntraVENous Q24H    doxycycline (VIBRAMYCIN) IV  100 mg IntraVENous Q12H    ipratropium 0.5 mg-albuterol 2.5 mg  1 Dose Inhalation 4x Daily RT       Continuous Infusions:   sodium chloride      sodium chloride         No Known Allergies    REVIEW OF SYSTEMS:  Constitutional: Denies fever, weight loss, night sweats, and fatigue  Skin: Denies pigmentation, dark lesions, and rashes   HEENT: Denies hearing loss, tinnitus, ear drainage, epistaxis, sore throat, and hoarseness.  Cardiovascular: Denies palpitations, chest pain, and chest

## 2024-03-26 LAB
ABO/RH: NORMAL
ANTIBODY SCREEN: NEGATIVE
ARM BAND NUMBER: NORMAL
BLOOD BANK BLOOD PRODUCT EXPIRATION DATE: NORMAL
BLOOD BANK BLOOD PRODUCT EXPIRATION DATE: NORMAL
BLOOD BANK DISPENSE STATUS: NORMAL
BLOOD BANK DISPENSE STATUS: NORMAL
BLOOD BANK ISBT PRODUCT BLOOD TYPE: 5100
BLOOD BANK ISBT PRODUCT BLOOD TYPE: 5100
BLOOD BANK PRODUCT CODE: NORMAL
BLOOD BANK PRODUCT CODE: NORMAL
BLOOD BANK SAMPLE EXPIRATION: NORMAL
BLOOD BANK UNIT TYPE AND RH: NORMAL
BLOOD BANK UNIT TYPE AND RH: NORMAL
BPU ID: NORMAL
BPU ID: NORMAL
COMPONENT: NORMAL
COMPONENT: NORMAL
CROSSMATCH RESULT: NORMAL
CROSSMATCH RESULT: NORMAL
HIV 1+2 AB+HIV1 P24 AG SERPL QL IA: REACTIVE
TRANSFUSION STATUS: NORMAL
TRANSFUSION STATUS: NORMAL
UNIT DIVISION: 0
UNIT DIVISION: 0
UNIT ISSUE DATE/TIME: NORMAL
UNIT ISSUE DATE/TIME: NORMAL

## 2024-03-30 LAB
MICROORGANISM SPEC CULT: NORMAL
MICROORGANISM SPEC CULT: NORMAL
SERVICE CMNT-IMP: NORMAL
SERVICE CMNT-IMP: NORMAL
SPECIMEN DESCRIPTION: NORMAL
SPECIMEN DESCRIPTION: NORMAL

## 2024-04-01 LAB
HIV 1 & 2 AB SERPLBLD IA.RAPID: NEGATIVE
HIV 2 AB SERPLBLD QL IA.RAPID: NEGATIVE
HIV1 AB SERPLBLD QL IA.RAPID: NEGATIVE

## 2024-12-27 ENCOUNTER — HOSPITAL ENCOUNTER (INPATIENT)
Age: 39
LOS: 1 days | Discharge: LEFT BEFORE DISCHARGE | DRG: 663 | End: 2024-12-28
Attending: STUDENT IN AN ORGANIZED HEALTH CARE EDUCATION/TRAINING PROGRAM | Admitting: INTERNAL MEDICINE
Payer: COMMERCIAL

## 2024-12-27 DIAGNOSIS — D64.9 SYMPTOMATIC ANEMIA: Primary | ICD-10-CM

## 2024-12-27 DIAGNOSIS — R42 LIGHTHEADEDNESS: ICD-10-CM

## 2024-12-27 DIAGNOSIS — I95.9 HYPOTENSION, UNSPECIFIED HYPOTENSION TYPE: ICD-10-CM

## 2024-12-27 LAB
EKG ATRIAL RATE: 65 BPM
EKG P AXIS: 7 DEGREES
EKG P-R INTERVAL: 156 MS
EKG Q-T INTERVAL: 424 MS
EKG QRS DURATION: 102 MS
EKG QTC CALCULATION (BAZETT): 440 MS
EKG R AXIS: 80 DEGREES
EKG T AXIS: 34 DEGREES
EKG VENTRICULAR RATE: 65 BPM

## 2024-12-27 PROCEDURE — 96361 HYDRATE IV INFUSION ADD-ON: CPT

## 2024-12-27 PROCEDURE — 99285 EMERGENCY DEPT VISIT HI MDM: CPT

## 2024-12-27 PROCEDURE — 93005 ELECTROCARDIOGRAM TRACING: CPT | Performed by: STUDENT IN AN ORGANIZED HEALTH CARE EDUCATION/TRAINING PROGRAM

## 2024-12-27 RX ORDER — 0.9 % SODIUM CHLORIDE 0.9 %
1000 INTRAVENOUS SOLUTION INTRAVENOUS ONCE
Status: COMPLETED | OUTPATIENT
Start: 2024-12-27 | End: 2024-12-28

## 2024-12-27 ASSESSMENT — PAIN - FUNCTIONAL ASSESSMENT: PAIN_FUNCTIONAL_ASSESSMENT: NONE - DENIES PAIN

## 2024-12-28 ENCOUNTER — APPOINTMENT (OUTPATIENT)
Dept: GENERAL RADIOLOGY | Age: 39
DRG: 663 | End: 2024-12-28
Payer: COMMERCIAL

## 2024-12-28 VITALS
OXYGEN SATURATION: 99 % | RESPIRATION RATE: 14 BRPM | HEIGHT: 72 IN | TEMPERATURE: 97.9 F | SYSTOLIC BLOOD PRESSURE: 104 MMHG | DIASTOLIC BLOOD PRESSURE: 50 MMHG | BODY MASS INDEX: 18.96 KG/M2 | HEART RATE: 71 BPM | WEIGHT: 140 LBS

## 2024-12-28 PROBLEM — F11.90 HEROIN USE: Status: ACTIVE | Noted: 2024-12-28

## 2024-12-28 PROBLEM — D64.9 ANEMIA: Status: ACTIVE | Noted: 2024-12-28

## 2024-12-28 PROBLEM — D64.9 SYMPTOMATIC ANEMIA: Status: ACTIVE | Noted: 2024-12-28

## 2024-12-28 PROBLEM — I95.9 HYPOTENSION: Status: ACTIVE | Noted: 2024-12-28

## 2024-12-28 PROBLEM — R79.89 ELEVATED PLATELET COUNT: Status: ACTIVE | Noted: 2024-12-28

## 2024-12-28 LAB
ABO + RH BLD: NORMAL
ALBUMIN SERPL-MCNC: 3.8 G/DL (ref 3.5–5.2)
ALP SERPL-CCNC: 115 U/L (ref 40–129)
ALT SERPL-CCNC: 9 U/L (ref 0–40)
ANION GAP SERPL CALCULATED.3IONS-SCNC: 10 MMOL/L (ref 7–16)
ARM BAND NUMBER: NORMAL
AST SERPL-CCNC: 8 U/L (ref 0–39)
BASOPHILS # BLD: 0.11 K/UL (ref 0–0.2)
BASOPHILS NFR BLD: 2 % (ref 0–2)
BILIRUB SERPL-MCNC: 0.3 MG/DL (ref 0–1.2)
BILIRUB UR QL STRIP: NEGATIVE
BLOOD BANK SAMPLE EXPIRATION: NORMAL
BLOOD GROUP ANTIBODIES SERPL: NEGATIVE
BUN SERPL-MCNC: 15 MG/DL (ref 6–20)
CALCIUM SERPL-MCNC: 9 MG/DL (ref 8.6–10.2)
CHLORIDE SERPL-SCNC: 101 MMOL/L (ref 98–107)
CLARITY UR: CLEAR
CO2 SERPL-SCNC: 26 MMOL/L (ref 22–29)
COLOR UR: YELLOW
CREAT SERPL-MCNC: 0.8 MG/DL (ref 0.7–1.2)
EOSINOPHIL # BLD: 0.22 K/UL (ref 0.05–0.5)
EOSINOPHILS RELATIVE PERCENT: 4 % (ref 0–6)
ERYTHROCYTE [DISTWIDTH] IN BLOOD BY AUTOMATED COUNT: 20.7 % (ref 11.5–15)
GFR, ESTIMATED: >90 ML/MIN/1.73M2
GLUCOSE SERPL-MCNC: 76 MG/DL (ref 74–99)
GLUCOSE UR STRIP-MCNC: NEGATIVE MG/DL
HCT VFR BLD AUTO: 30.2 % (ref 37–54)
HGB BLD-MCNC: 7.6 G/DL (ref 12.5–16.5)
HGB UR QL STRIP.AUTO: NEGATIVE
INR PPP: 1.6
KETONES UR STRIP-MCNC: NEGATIVE MG/DL
LACTATE BLDV-SCNC: 1.7 MMOL/L (ref 0.5–2.2)
LEUKOCYTE ESTERASE UR QL STRIP: NEGATIVE
LYMPHOCYTES NFR BLD: 1.7 K/UL (ref 1.5–4)
LYMPHOCYTES RELATIVE PERCENT: 27 % (ref 20–42)
MAGNESIUM SERPL-MCNC: 2 MG/DL (ref 1.6–2.6)
MCH RBC QN AUTO: 14.8 PG (ref 26–35)
MCHC RBC AUTO-ENTMCNC: 25.2 G/DL (ref 32–34.5)
MCV RBC AUTO: 59 FL (ref 80–99.9)
MONOCYTES NFR BLD: 0.27 K/UL (ref 0.1–0.95)
MONOCYTES NFR BLD: 4 % (ref 2–12)
NEUTROPHILS NFR BLD: 64 % (ref 43–80)
NEUTS SEG NFR BLD: 4 K/UL (ref 1.8–7.3)
NITRITE UR QL STRIP: NEGATIVE
PH UR STRIP: 6 [PH] (ref 5–9)
PLATELET # BLD AUTO: 834 K/UL (ref 130–450)
PMV BLD AUTO: 8.5 FL (ref 7–12)
POTASSIUM SERPL-SCNC: 3.6 MMOL/L (ref 3.5–5)
PROT SERPL-MCNC: 8.3 G/DL (ref 6.4–8.3)
PROT UR STRIP-MCNC: NEGATIVE MG/DL
PROTHROMBIN TIME: 17.1 SEC (ref 9.3–12.4)
RBC # BLD AUTO: 5.12 M/UL (ref 3.8–5.8)
RBC # BLD: ABNORMAL 10*6/UL
RBC #/AREA URNS HPF: NORMAL /HPF
SODIUM SERPL-SCNC: 137 MMOL/L (ref 132–146)
SP GR UR STRIP: 1.02 (ref 1–1.03)
TROPONIN I SERPL HS-MCNC: 8 NG/L (ref 0–11)
UROBILINOGEN UR STRIP-ACNC: 0.2 EU/DL (ref 0–1)
WBC #/AREA URNS HPF: NORMAL /HPF
WBC OTHER # BLD: 6.3 K/UL (ref 4.5–11.5)

## 2024-12-28 PROCEDURE — 2580000003 HC RX 258: Performed by: STUDENT IN AN ORGANIZED HEALTH CARE EDUCATION/TRAINING PROGRAM

## 2024-12-28 PROCEDURE — 96360 HYDRATION IV INFUSION INIT: CPT

## 2024-12-28 PROCEDURE — 86900 BLOOD TYPING SEROLOGIC ABO: CPT

## 2024-12-28 PROCEDURE — 82746 ASSAY OF FOLIC ACID SERUM: CPT

## 2024-12-28 PROCEDURE — 87086 URINE CULTURE/COLONY COUNT: CPT

## 2024-12-28 PROCEDURE — 83605 ASSAY OF LACTIC ACID: CPT

## 2024-12-28 PROCEDURE — 71045 X-RAY EXAM CHEST 1 VIEW: CPT

## 2024-12-28 PROCEDURE — 86901 BLOOD TYPING SEROLOGIC RH(D): CPT

## 2024-12-28 PROCEDURE — 84484 ASSAY OF TROPONIN QUANT: CPT

## 2024-12-28 PROCEDURE — 2060000000 HC ICU INTERMEDIATE R&B

## 2024-12-28 PROCEDURE — 82728 ASSAY OF FERRITIN: CPT

## 2024-12-28 PROCEDURE — 36415 COLL VENOUS BLD VENIPUNCTURE: CPT

## 2024-12-28 PROCEDURE — 83540 ASSAY OF IRON: CPT

## 2024-12-28 PROCEDURE — 99223 1ST HOSP IP/OBS HIGH 75: CPT | Performed by: INTERNAL MEDICINE

## 2024-12-28 PROCEDURE — 86850 RBC ANTIBODY SCREEN: CPT

## 2024-12-28 PROCEDURE — 85025 COMPLETE CBC W/AUTO DIFF WBC: CPT

## 2024-12-28 PROCEDURE — 85610 PROTHROMBIN TIME: CPT

## 2024-12-28 PROCEDURE — 82607 VITAMIN B-12: CPT

## 2024-12-28 PROCEDURE — 83550 IRON BINDING TEST: CPT

## 2024-12-28 PROCEDURE — 80053 COMPREHEN METABOLIC PANEL: CPT

## 2024-12-28 PROCEDURE — 2500000003 HC RX 250 WO HCPCS: Performed by: INTERNAL MEDICINE

## 2024-12-28 PROCEDURE — 83735 ASSAY OF MAGNESIUM: CPT

## 2024-12-28 PROCEDURE — 81001 URINALYSIS AUTO W/SCOPE: CPT

## 2024-12-28 RX ORDER — SODIUM CHLORIDE 9 MG/ML
INJECTION, SOLUTION INTRAVENOUS PRN
Status: DISCONTINUED | OUTPATIENT
Start: 2024-12-28 | End: 2024-12-28 | Stop reason: HOSPADM

## 2024-12-28 RX ORDER — ONDANSETRON 4 MG/1
4 TABLET, ORALLY DISINTEGRATING ORAL EVERY 8 HOURS PRN
Status: DISCONTINUED | OUTPATIENT
Start: 2024-12-28 | End: 2024-12-28 | Stop reason: HOSPADM

## 2024-12-28 RX ORDER — ACETAMINOPHEN 650 MG/1
650 SUPPOSITORY RECTAL EVERY 6 HOURS PRN
Status: DISCONTINUED | OUTPATIENT
Start: 2024-12-28 | End: 2024-12-28 | Stop reason: HOSPADM

## 2024-12-28 RX ORDER — 0.9 % SODIUM CHLORIDE 0.9 %
1000 INTRAVENOUS SOLUTION INTRAVENOUS ONCE
Status: COMPLETED | OUTPATIENT
Start: 2024-12-28 | End: 2024-12-28

## 2024-12-28 RX ORDER — POLYETHYLENE GLYCOL 3350 17 G/17G
17 POWDER, FOR SOLUTION ORAL DAILY PRN
Status: DISCONTINUED | OUTPATIENT
Start: 2024-12-28 | End: 2024-12-28 | Stop reason: HOSPADM

## 2024-12-28 RX ORDER — SODIUM CHLORIDE 0.9 % (FLUSH) 0.9 %
5-40 SYRINGE (ML) INJECTION EVERY 12 HOURS SCHEDULED
Status: DISCONTINUED | OUTPATIENT
Start: 2024-12-28 | End: 2024-12-28 | Stop reason: HOSPADM

## 2024-12-28 RX ORDER — DEXTROSE MONOHYDRATE AND SODIUM CHLORIDE 5; .45 G/100ML; G/100ML
INJECTION, SOLUTION INTRAVENOUS CONTINUOUS
Status: DISCONTINUED | OUTPATIENT
Start: 2024-12-28 | End: 2024-12-28 | Stop reason: HOSPADM

## 2024-12-28 RX ORDER — ONDANSETRON 2 MG/ML
4 INJECTION INTRAMUSCULAR; INTRAVENOUS EVERY 6 HOURS PRN
Status: DISCONTINUED | OUTPATIENT
Start: 2024-12-28 | End: 2024-12-28 | Stop reason: HOSPADM

## 2024-12-28 RX ORDER — SODIUM CHLORIDE 0.9 % (FLUSH) 0.9 %
5-40 SYRINGE (ML) INJECTION PRN
Status: DISCONTINUED | OUTPATIENT
Start: 2024-12-28 | End: 2024-12-28 | Stop reason: HOSPADM

## 2024-12-28 RX ORDER — ACETAMINOPHEN 325 MG/1
650 TABLET ORAL EVERY 6 HOURS PRN
Status: DISCONTINUED | OUTPATIENT
Start: 2024-12-28 | End: 2024-12-28 | Stop reason: HOSPADM

## 2024-12-28 RX ADMIN — SODIUM CHLORIDE, PRESERVATIVE FREE 10 ML: 5 INJECTION INTRAVENOUS at 09:59

## 2024-12-28 RX ADMIN — SODIUM CHLORIDE 1000 ML: 9 INJECTION, SOLUTION INTRAVENOUS at 02:10

## 2024-12-28 RX ADMIN — SODIUM CHLORIDE 1000 ML: 9 INJECTION, SOLUTION INTRAVENOUS at 00:45

## 2024-12-28 ASSESSMENT — ENCOUNTER SYMPTOMS
NAUSEA: 0
VOMITING: 0
CONSTIPATION: 0
SHORTNESS OF BREATH: 0
BLOOD IN STOOL: 0
COUGH: 0
DIARRHEA: 1
ABDOMINAL PAIN: 0

## 2024-12-28 NOTE — H&P
[facility]   History and Physical      CHIEF COMPLAINT:  lightheadedness    History of Present Illness: pt is a 39 y.o. male who presents for lightheadedness. Pt also notes fatigue. Pt has had for past 1 week approx. Pt states that he felt like he was anemic again. Pt notes some BARRIENTOS. Pt states he felt this way when he was anemic the last time. Pt notes some diarrhea within last 24 hours. Pt denies fevers, chills,n/v, abd pain, constipation, melena, hematochezia, change in urination, dysuria, or hematuria.     REVIEW OF SYSTEMS:    A detailed system review was conducted with patient and is negative unless stated in hpi.        PMH:  Past Medical History:   Diagnosis Date    Heroin use        Surgical History:  Past Surgical History:   Procedure Laterality Date    LEG BIOPSY EXCISION N/A 9/13/2020    LEG LESION BIOPSY EXCISION performed by Lavell Dockery MD at [facility] OR       Medications Prior to Admission:    Prior to Admission medications    Medication Sig Start Date End Date Taking? Authorizing Provider   dextromethorphan-guaiFENesin (MUCINEX DM)  MG per extended release tablet Take 1 tablet by mouth every 12 hours as needed for Cough or Congestion    ProviderDaya MD   Phjknlvsz-SQX-MI-APAP (DESIRAE-SELTZER PLUS COLD & COUGH PO) Take 2 tablets by mouth every 4 hours as needed (COUGH/COLD S/S)    Provider, MD Daya       Allergies:    Patient has no known allergies.    Social History:    reports that he has been smoking. He has never used smokeless tobacco. He reports current drug use.    Family History: pt states dm runs in family  No family history on file.        PHYSICAL EXAM:    Vitals:  /71   Pulse 69   Temp 98.2 °F (36.8 °C) (Oral)   Resp 14   Ht 1.829 m (6')   Wt 63.5 kg (140 lb)   SpO2 98%   BMI 18.99 kg/m²     General:  Appears comfortable. Answers questions appropriately and cooperative with exam  HEENT:  Mucous membranes moist. No erythema,  rhinorrhea, or post-nasal drip noted.  Neck:  No carotid bruits.  Heart:  Rhythm regular at rate of 70  Lungs:  CTA.  No wheeze, rales, or rhonchi  Abdomen:  Positive bowel sounds positive.  Soft.  Non-tender. No guarding, rebound or rigidity.  Breast/Rectal/Genitourinary: not pertinent.    Extremities:  Negative for lower extremity edema  Skin:  Warm and dry  Vascular: 2/4 Dorsalis Pedis pulses bilaterally.  Neuro:  Cranial nerves 2-12 grossly intact, no focal weakness or change in sensation noted.  Extraocular muscles intact.  Pupils equal, round, reactive to light.            LABS:  Recent Labs     12/28/24 0037      K 3.6      CO2 26   BUN 15   CREATININE 0.8   GLUCOSE 76   CALCIUM 9.0       Recent Labs     12/28/24 0037   WBC 6.3   RBC 5.12   HGB 7.6*   HCT 30.2*   MCV 59.0*   MCH 14.8*   MCHC 25.2*   RDW 20.7*   *   MPV 8.5       No results for input(s): \"POCGLU\" in the last 72 hours.    CBC with Differential:    Lab Results   Component Value Date/Time    WBC 6.3 12/28/2024 12:37 AM    RBC 5.12 12/28/2024 12:37 AM    HGB 7.6 12/28/2024 12:37 AM    HCT 30.2 12/28/2024 12:37 AM     12/28/2024 12:37 AM    MCV 59.0 12/28/2024 12:37 AM    MCH 14.8 12/28/2024 12:37 AM    MCHC 25.2 12/28/2024 12:37 AM    RDW 20.7 12/28/2024 12:37 AM    NRBC 2 03/24/2024 08:15 PM    LYMPHOPCT 27 12/28/2024 12:37 AM    MONOPCT 4 12/28/2024 12:37 AM    EOSPCT 4 12/28/2024 12:37 AM    BASOPCT 2 12/28/2024 12:37 AM    MONOSABS 0.27 12/28/2024 12:37 AM    LYMPHSABS 1.70 12/28/2024 12:37 AM    EOSABS 0.22 12/28/2024 12:37 AM    BASOSABS 0.11 12/28/2024 12:37 AM     CMP:    Lab Results   Component Value Date/Time     12/28/2024 12:37 AM    K 3.6 12/28/2024 12:37 AM     12/28/2024 12:37 AM    CO2 26 12/28/2024 12:37 AM    BUN 15 12/28/2024 12:37 AM    CREATININE 0.8 12/28/2024 12:37 AM    GFRAA >60 09/12/2020 01:35 PM    LABGLOM >90 12/28/2024 12:37 AM    LABGLOM >60 03/25/2024 04:58 AM    GLUCOSE 76

## 2024-12-28 NOTE — CARE COORDINATION
Patient seen and examined -patient states that he wants to leave AGAINST MEDICAL ADVICE.  I discussed with him concern for gastrointestinal source of blood loss and advised that it is unsafe for him to leave the hospital without a further workup.  Patient verbalized understanding but states that he wants to leave the hospital.  I have provided him with the phone number for our office and encouraged patient to call for appointment and further follow-up.  Patient has verbalized understanding and agreement.  Thank you    Cheng Montenegro MD

## 2024-12-28 NOTE — PROGRESS NOTES
Patient came up to desk requesting AMA paperwork.  When questioned reasoning for wanting to leave, patient states \"its time for me to go\".  Explained the risks of leaving against medical advice, including injury and or death, patient states understanding.  Call Made out to Dr. Camargo to update on status.

## 2024-12-28 NOTE — ED NOTES
.ED to Inpatient Handoff Report    Notified Chris that electronic handoff available and patient ready for transport to room 740.    Safety Risks: Risk of falls    Patient in Restraints: no    Constant Observer or Patient : no    Telemetry Monitoring Ordered: No          Order to transfer to unit without monitor: NO    Last MEWS: 0 Time completed: 0559    Deterioration Index: 23.45    Vitals:    12/28/24 0214 12/28/24 0244 12/28/24 0312 12/28/24 0559   BP: (!) 106/55 118/61 124/64 123/71   Pulse: 64 64 69 69   Resp: 10  11 14   Temp:    98.2 °F (36.8 °C)   TempSrc:    Oral   SpO2: 100% 100% 100% 98%   Weight:       Height:           Opportunity for questions and clarification was provided.

## 2024-12-28 NOTE — DISCHARGE SUMMARY
Notified by nursing that pt wants to leave ama. Pt admitted and left AMA in 24 hours. Please see H&P for further information.

## 2024-12-28 NOTE — ED PROVIDER NOTES
[facility] MED SURG  EMERGENCY DEPARTMENT ENCOUNTER        Pt Name: Adolfo Hebert  MRN: [id number]  Birthdate 1985  Date of evaluation: 12/27/2024  Provider: Gail Olivera DO  PCP: No primary care provider on file.  Note Started: 1:51 AM EST 12/28/24    CHIEF COMPLAINT       Chief Complaint   Patient presents with    Dizziness    Lightheadedness     Pale, has had blood transfusion in the past         HISTORY OF PRESENT ILLNESS: 1 or more Elements     Limitations to history : None    Adolfo Hebert is a 39-year-old male who presents to the ED for evaluation of fatigue lightheadedness and feeling like he is going to pass out.  Patient says it has been ongoing over the past week and he says he feels like he looks very pale.  He reports having diarrhea today; says it is greenish in color.  Denies any abdominal pain.  No nausea or vomiting.  Denies any black or bloody stools.  Denies any history of GI bleed; he did however get admitted in March of this year for evaluation after he was seen for influenza and had low hemoglobin requiring transfusions.  Patient says that he was told he would likely need an endoscopy and colonoscopy, but he declined stating that he did not feel like it was the cause of his anemia.  Since discharge in March, the patient has not followed up with any doctors.  I did review his chart history from that encounter; he was discharged on 3/24/2024; he was discharged AMA.  He got 2 units of PRBCs at that time and refused an EGD. He did have an HIV screen at the time which was negative.    Right now, patient denies any fevers or chills or recent illnesses.  Denies any abnormal urinary symptoms, numbness or weakness anywhere, headaches or room spinning dizziness or changes in vision.      Nursing Notes were all reviewed and agreed with or any disagreements were addressed in the HPI.      REVIEW OF EXTERNAL NOTE :       Reviewed documentation from hospital admission 3/24

## 2024-12-28 NOTE — PROGRESS NOTES
4 Eyes Skin Assessment     NAME:  Adolfo Hebert  YOB: 1985  MEDICAL RECORD NUMBER:  [id number]    The patient is being assessed for  Admission    I agree that at least one RN has performed a thorough Head to Toe Skin Assessment on the patient. ALL assessment sites listed below have been assessed.      Areas assessed by both nurses:    Head, Face, Ears, Shoulders, Back, Chest, Arms, Elbows, Hands, Sacrum. Buttock, Coccyx, Ischium, Legs. Feet and Heels, and Under Medical Devices         Does the Patient have a Wound? Yes wound(s) were present on assessment. LDA wound assessment was Initiated and completed by RN       Jacob Prevention initiated by RN: Yes  Wound Care Orders initiated by RN: Yes    Pressure Injury (Stage 3,4, Unstageable, DTI, NWPT, and Complex wounds) if present, place Wound referral order by RN under : No    New Ostomies, if present place, Ostomy referral order under : No     Nurse 1 eSignature: Electronically signed by Ortega Barnes RN on 12/28/24 at 10:12 AM EST    **SHARE this note so that the co-signing nurse can place an eSignature**    Nurse 2 eSignature: {Esignature:[id number]}

## 2024-12-29 LAB
FERRITIN SERPL-MCNC: 29 NG/ML
FOLATE SERPL-MCNC: 5.6 NG/ML (ref 4.8–24.2)
IRON SATN MFR SERPL: 6 % (ref 20–55)
IRON SERPL-MCNC: 15 UG/DL (ref 59–158)
MICROORGANISM SPEC CULT: ABNORMAL
SERVICE CMNT-IMP: ABNORMAL
SPECIMEN DESCRIPTION: ABNORMAL
TIBC SERPL-MCNC: 239 UG/DL (ref 250–450)
VIT B12 SERPL-MCNC: 322 PG/ML (ref 211–946)

## 2024-12-30 PROCEDURE — 93010 ELECTROCARDIOGRAM REPORT: CPT | Performed by: INTERNAL MEDICINE

## 2025-02-10 ENCOUNTER — OFFICE VISIT (OUTPATIENT)
Dept: PRIMARY CARE CLINIC | Age: 40
End: 2025-02-10

## 2025-02-10 VITALS
OXYGEN SATURATION: 100 % | DIASTOLIC BLOOD PRESSURE: 60 MMHG | SYSTOLIC BLOOD PRESSURE: 110 MMHG | WEIGHT: 134 LBS | BODY MASS INDEX: 18.15 KG/M2 | HEIGHT: 72 IN | TEMPERATURE: 97.9 F | RESPIRATION RATE: 16 BRPM | HEART RATE: 99 BPM

## 2025-02-10 DIAGNOSIS — Z87.891 PERSONAL HISTORY OF TOBACCO USE, PRESENTING HAZARDS TO HEALTH: ICD-10-CM

## 2025-02-10 DIAGNOSIS — D64.9 SYMPTOMATIC ANEMIA: ICD-10-CM

## 2025-02-10 DIAGNOSIS — R79.89 ELEVATED PLATELET COUNT: ICD-10-CM

## 2025-02-10 DIAGNOSIS — F11.10 HEROIN ABUSE (HCC): ICD-10-CM

## 2025-02-10 DIAGNOSIS — Z00.01 ENCOUNTER FOR WELL ADULT EXAM WITH ABNORMAL FINDINGS: Primary | ICD-10-CM

## 2025-02-10 PROBLEM — J18.9 MULTIFOCAL PNEUMONIA: Status: RESOLVED | Noted: 2024-03-25 | Resolved: 2025-02-10

## 2025-02-10 PROBLEM — L03.90 CELLULITIS: Status: RESOLVED | Noted: 2020-09-11 | Resolved: 2025-02-10

## 2025-02-10 PROBLEM — J10.1 INFLUENZA B: Status: RESOLVED | Noted: 2024-03-25 | Resolved: 2025-02-10

## 2025-02-10 PROBLEM — F11.90 HEROIN USE: Status: RESOLVED | Noted: 2024-12-28 | Resolved: 2025-02-10

## 2025-02-10 PROBLEM — I95.9 HYPOTENSION: Status: RESOLVED | Noted: 2024-12-28 | Resolved: 2025-02-10

## 2025-02-10 RX ORDER — DOCUSATE SODIUM 50 MG AND SENNOSIDES 8.6 MG 8.6; 5 MG/1; MG/1
1 TABLET, FILM COATED ORAL 2 TIMES DAILY
COMMUNITY
Start: 2025-01-03

## 2025-02-10 RX ORDER — ASCORBIC ACID 500 MG
500 TABLET ORAL 2 TIMES DAILY
Qty: 60 TABLET | Refills: 3 | Status: SHIPPED | OUTPATIENT
Start: 2025-02-10

## 2025-02-10 RX ORDER — FERROUS SULFATE 325(65) MG
325 TABLET ORAL 2 TIMES DAILY
COMMUNITY
Start: 2025-01-30

## 2025-02-10 RX ORDER — OMEPRAZOLE 40 MG/1
40 CAPSULE, DELAYED RELEASE ORAL DAILY
COMMUNITY
Start: 2025-01-03

## 2025-02-10 ASSESSMENT — PATIENT HEALTH QUESTIONNAIRE - PHQ9
SUM OF ALL RESPONSES TO PHQ QUESTIONS 1-9: 0
2. FEELING DOWN, DEPRESSED OR HOPELESS: NOT AT ALL
SUM OF ALL RESPONSES TO PHQ QUESTIONS 1-9: 0
SUM OF ALL RESPONSES TO PHQ9 QUESTIONS 1 & 2: 0
SUM OF ALL RESPONSES TO PHQ QUESTIONS 1-9: 0
1. LITTLE INTEREST OR PLEASURE IN DOING THINGS: NOT AT ALL
SUM OF ALL RESPONSES TO PHQ QUESTIONS 1-9: 0

## 2025-02-10 NOTE — PATIENT INSTRUCTIONS
includes prescription medicines, marijuana, and other drugs.     Avoid tobacco and nicotine: Don't smoke, vape, or chew. If you need help quitting, talk to your doctor.   Practice safer sex. Getting tested, using condoms or dental dams, and limiting sex partners can help prevent STIs.     Use birth control if it's important to you to prevent pregnancy. Talk with your doctor about your choices and what might be best for you.   Prevent problems where you can. Protect your skin from too much sun, wash your hands, brush your teeth twice a day, and wear a seat belt in the car.   Where can you learn more?  Go to https://[email or web address].net/patientEd and enter P072 to learn more about \"Well Visit, Ages 18 to 65: Care Instructions.\"  Current as of: April 30, 2024  Content Version: 14.3  © 2024 [email or web address].   Care instructions adapted under license by [employer]. If you have questions about a medical condition or this instruction, always ask your healthcare professional. [employer] disclaims any warranty or liability for your use of this information.

## 2025-02-10 NOTE — PROGRESS NOTES
Well Adult Note  Name: Adolfo Hebert Today’s Date: 2/10/2025   MRN: [id number] Sex: Male   Age: 39 y.o. Ethnicity: Non- / Non    : 1985 Race: White (non-)      Adolfo Hebert is here for a well adult exam.       Assessment & Plan   Encounter for well adult exam with abnormal findings  Personal history of tobacco use, presenting hazards to health  -     GA TOBACCO USE CESSATION INTERMEDIATE 3-10 MINUTES [89049]  Discussed methods of smoking cessation including Nicoderm patch, gum, and inhaler, Zyban and Chantix. All side effects explained. Pt chose none.  Discussed for 5 minutes.   Heroin abuse (HCC)  -     Sara Donnelly PMHNP, [facility] ([facility])  Elevated platelet count  Symptomatic anemia  -     vitamin C (ASCORBIC ACID) 500 MG tablet; Take 1 tablet by mouth 2 times daily, Disp-60 tablet, R-3Normal        Return in about 1 year (around 2/10/2026).       Subjective   History:  Chronic anemia, iron deficiency, r/o gi blood loss, EGD and  colonoscopy scheduled.  [facility], Dr Holliday. Apt   He has used heroin \"my whole life\" and it started from a rx opiate as a young adult.     Review of Systems   All other systems reviewed and are negative.      Allergies   Allergen Reactions    Protonix [Pantoprazole] Anaphylaxis     Prior to Visit Medications    Medication Sig Taking? Authorizing Provider   ferrous sulfate (IRON 325) 325 (65 Fe) MG tablet Take 1 tablet by mouth in the morning and 1 tablet in the evening. Yes ProviderDaya MD   omeprazole (PRILOSEC) 40 MG delayed release capsule Take 1 capsule by mouth daily Yes ProviderDaya MD   STIMULANT LAXATIVE 8.6-50 MG per tablet Take 1 tablet by mouth 2 times daily Yes ProviderDaya MD   vitamin C (ASCORBIC ACID) 500 MG tablet Take 1 tablet by mouth 2 times daily Yes Fani Villalobos PA-C     Past Medical History:   Diagnosis Date    Heroin use      Past

## 2025-04-11 ENCOUNTER — OFFICE VISIT (OUTPATIENT)
Dept: PRIMARY CARE CLINIC | Age: 40
End: 2025-04-11
Payer: COMMERCIAL

## 2025-04-11 VITALS
DIASTOLIC BLOOD PRESSURE: 60 MMHG | SYSTOLIC BLOOD PRESSURE: 122 MMHG | WEIGHT: 134 LBS | HEIGHT: 72 IN | HEART RATE: 70 BPM | OXYGEN SATURATION: 100 % | BODY MASS INDEX: 18.15 KG/M2 | RESPIRATION RATE: 18 BRPM | TEMPERATURE: 97 F

## 2025-04-11 DIAGNOSIS — J40 BRONCHITIS: Primary | ICD-10-CM

## 2025-04-11 DIAGNOSIS — D64.9 SYMPTOMATIC ANEMIA: ICD-10-CM

## 2025-04-11 PROCEDURE — G8427 DOCREV CUR MEDS BY ELIG CLIN: HCPCS | Performed by: PHYSICIAN ASSISTANT

## 2025-04-11 PROCEDURE — 99213 OFFICE O/P EST LOW 20 MIN: CPT | Performed by: PHYSICIAN ASSISTANT

## 2025-04-11 PROCEDURE — G8419 CALC BMI OUT NRM PARAM NOF/U: HCPCS | Performed by: PHYSICIAN ASSISTANT

## 2025-04-11 PROCEDURE — 4004F PT TOBACCO SCREEN RCVD TLK: CPT | Performed by: PHYSICIAN ASSISTANT

## 2025-04-11 RX ORDER — ALBUTEROL SULFATE 90 UG/1
2 INHALANT RESPIRATORY (INHALATION) 4 TIMES DAILY PRN
Qty: 54 G | Refills: 1 | Status: SHIPPED | OUTPATIENT
Start: 2025-04-11

## 2025-04-11 RX ORDER — GUAIFENESIN/DEXTROMETHORPHAN 100-10MG/5
5 SYRUP ORAL 3 TIMES DAILY PRN
Qty: 120 ML | Refills: 0 | Status: SHIPPED | OUTPATIENT
Start: 2025-04-11 | End: 2025-04-21

## 2025-04-11 RX ORDER — DOXYCYCLINE HYCLATE 100 MG
100 TABLET ORAL 2 TIMES DAILY
Qty: 20 TABLET | Refills: 0 | Status: SHIPPED | OUTPATIENT
Start: 2025-04-11 | End: 2025-04-21

## 2025-04-11 NOTE — PROGRESS NOTES
Chief Complaint   Patient presents with    Sinus Problem     For about 2 weeks, head congestion, drainage with discolored sputum (yellowish)        Congestion:  Patient is here with complaints of congestion, sinus pressure, drainage and cough for 2 week(s).  Cough is  productive. Sx worsening.  Patient has not had fever. Patient has not had sore throat or ear pain. Over the counter medications include none.  Patient does not have a change in appetite.  Patient is  drinking well.  Patient does smoke. Patient does not have asthma or COPD.  Sick contacts include none.    EGD was ordered, but he canceled. He plans to reschedule. He saw Hematology in Feb. Note reviewed. He feels \"good\" taking the iron.     Patient's past medical, surgical, social and/or family history reviewed, updated in chart, and are non-contributory (unless otherwise stated).  Medications and allergies also reviewed and updated in chart.      Review of Systems:  Constitutional:  - fever, + fatigue, + chills, + headaches, no weight change, +reduced appetite  Dermatology:  No rash, no mole, no dry or sensitive skin  ENT:  + cough, + sore throat, + sinus pain, + runny nose, no ear pain  Cardiology:  No chest pain, no palpitations, no leg edema, no shortness of breath, no PND  Gastroenterology:  No dysphagia, no abdominal pain, no nausea, no vomiting, no constipation, no diarrhea, no heartburn  Musculoskeletal:  No joint pain, no leg cramps, no back pain, no muscle aches  Respiratory:  No shortness of breath, no orthopnea, no wheezing, no BARRIENTOS, no hemoptysis  Urology:  No blood in the urine, no urinary frequency, no urinary incontinence, no urinary urgency, no nocturia, no dysuria    Vitals:    04/11/25 0818   BP: 122/60   Pulse: 70   Resp: 18   Temp: 97 °F (36.1 °C)   TempSrc: Temporal   SpO2: 100%   Weight: 60.8 kg (134 lb)   Height: 1.829 m (6' 0.01\")       Physical Exam  Constitutional:       General: He is not in acute distress.     Appearance:

## 2025-05-30 ENCOUNTER — OFFICE VISIT (OUTPATIENT)
Dept: FAMILY MEDICINE CLINIC | Age: 40
End: 2025-05-30
Payer: COMMERCIAL

## 2025-05-30 VITALS
HEIGHT: 72 IN | OXYGEN SATURATION: 99 % | WEIGHT: 131 LBS | SYSTOLIC BLOOD PRESSURE: 102 MMHG | TEMPERATURE: 97 F | BODY MASS INDEX: 17.74 KG/M2 | DIASTOLIC BLOOD PRESSURE: 60 MMHG | HEART RATE: 128 BPM

## 2025-05-30 DIAGNOSIS — D64.9 ANEMIA, UNSPECIFIED TYPE: Primary | ICD-10-CM

## 2025-05-30 DIAGNOSIS — R61 NIGHT SWEATS: ICD-10-CM

## 2025-05-30 LAB — HGB, POC: 8.1 G/DL

## 2025-05-30 PROCEDURE — 99214 OFFICE O/P EST MOD 30 MIN: CPT | Performed by: FAMILY MEDICINE

## 2025-05-30 PROCEDURE — 85018 HEMOGLOBIN: CPT | Performed by: FAMILY MEDICINE

## 2025-05-30 PROCEDURE — G8419 CALC BMI OUT NRM PARAM NOF/U: HCPCS | Performed by: FAMILY MEDICINE

## 2025-05-30 PROCEDURE — 4004F PT TOBACCO SCREEN RCVD TLK: CPT | Performed by: FAMILY MEDICINE

## 2025-05-30 PROCEDURE — G8427 DOCREV CUR MEDS BY ELIG CLIN: HCPCS | Performed by: FAMILY MEDICINE

## 2025-05-30 RX ORDER — DOXYCYCLINE 100 MG/1
100 TABLET ORAL 2 TIMES DAILY
Qty: 20 TABLET | Refills: 0 | Status: SHIPPED | OUTPATIENT
Start: 2025-05-30 | End: 2025-06-09

## 2025-05-30 RX ORDER — CEFDINIR 300 MG/1
300 CAPSULE ORAL 2 TIMES DAILY
Qty: 14 CAPSULE | Refills: 0 | Status: SHIPPED | OUTPATIENT
Start: 2025-05-30 | End: 2025-06-06

## 2025-05-30 ASSESSMENT — ENCOUNTER SYMPTOMS
DIARRHEA: 0
NAUSEA: 0
WHEEZING: 1
PHOTOPHOBIA: 0
SHORTNESS OF BREATH: 0
VOMITING: 0
BACK PAIN: 0
ABDOMINAL PAIN: 0
CONSTIPATION: 0
SORE THROAT: 0
COUGH: 1
BLOOD IN STOOL: 0

## 2025-05-30 NOTE — PROGRESS NOTES
Adolfo Hebert (:  1985) is a 39 y.o. male,Established patient, here for evaluation of the following chief complaint(s):  Anemia and Fatigue         Assessment & Plan  Anemia, unspecified type  At this time we will treat symptomatically based on the wheezes and night sweats to cover for community-acquired pneumonia.  Baseline labs ordered as well for further evaluation of the patient's complaints.  Red flags discussed if these occur he is to go directly to the nearest emergency department.  Patient voiced understanding.  Follow-up with PCP in 1 week.    Orders:    POCT hemoglobin    Iron and TIBC; Future    Ferritin; Future    Electrophoresis Protein, Serum; Future    Vitamin B12 & Folate; Future    Protime-INR; Future    NELLA; Future    HIV Screen; Future    Treponema Pallidum AB TP-PA; Future    RPR; Future    Hepatitis Panel, Acute; Future    Reticulocytes; Future    Path Review, Smear; Future    Transferrin; Future    Culture, Blood 1; Future    Culture, Blood 2; Future    Legionella antigen, urine; Future    Strep Pneumoniae Antigen; Future    T-Spot TB Test; Future    CK; Future    CBC with Auto Differential; Future    Comprehensive Metabolic Panel with Bilirubin; Future    TSH; Future    Urinalysis with Microscopic; Future    PAIN MANAGEMENT PROFILE 1 W/ CONFIRMATION, URINE; Future    cefdinir (OMNICEF) 300 MG capsule; Take 1 capsule by mouth 2 times daily for 7 days    doxycycline monohydrate (ADOXA) 100 MG tablet; Take 1 tablet by mouth 2 times daily for 10 days    Night sweats  As above    Orders:    Iron and TIBC; Future    Ferritin; Future    Electrophoresis Protein, Serum; Future    Vitamin B12 & Folate; Future    Protime-INR; Future    NELLA; Future    HIV Screen; Future    Treponema Pallidum AB TP-PA; Future    RPR; Future    Hepatitis Panel, Acute; Future    Reticulocytes; Future    Path Review, Smear; Future    Transferrin; Future    Culture, Blood 1; Future    Culture, Blood 2;

## 2025-06-03 ENCOUNTER — HOSPITAL ENCOUNTER (OUTPATIENT)
Age: 40
Discharge: HOME | End: 2025-06-03
Payer: COMMERCIAL

## 2025-06-03 ENCOUNTER — TELEPHONE (OUTPATIENT)
Dept: PRIMARY CARE CLINIC | Age: 40
End: 2025-06-03

## 2025-06-03 DIAGNOSIS — D64.9 ANEMIA, UNSPECIFIED TYPE: ICD-10-CM

## 2025-06-03 DIAGNOSIS — R61 NIGHT SWEATS: ICD-10-CM

## 2025-06-03 LAB
ALBUMIN SERPL-MCNC: 3.6 G/DL (ref 3.5–5.2)
ALP SERPL-CCNC: 120 U/L (ref 40–129)
ALT SERPL-CCNC: 17 U/L (ref 0–40)
ANION GAP SERPL CALCULATED.3IONS-SCNC: 12 MMOL/L (ref 7–16)
AST SERPL-CCNC: 17 U/L (ref 0–39)
BACTERIA URNS QL MICRO: ABNORMAL
BASOPHILS # BLD: 0.04 K/UL (ref 0–0.2)
BASOPHILS NFR BLD: 1 % (ref 0–2)
BILIRUB DIRECT SERPL-MCNC: <0.2 MG/DL (ref 0–0.3)
BILIRUB INDIRECT SERPL-MCNC: NORMAL MG/DL (ref 0–1)
BILIRUB SERPL-MCNC: <0.2 MG/DL (ref 0–1.2)
BILIRUB UR QL STRIP: NEGATIVE
BUN SERPL-MCNC: 9 MG/DL (ref 6–20)
CALCIUM SERPL-MCNC: 9.2 MG/DL (ref 8.6–10.2)
CHLORIDE SERPL-SCNC: 102 MMOL/L (ref 98–107)
CK SERPL-CCNC: 36 U/L (ref 20–200)
CLARITY UR: CLEAR
CO2 SERPL-SCNC: 23 MMOL/L (ref 22–29)
COLOR UR: YELLOW
CREAT SERPL-MCNC: 0.8 MG/DL (ref 0.7–1.2)
EOSINOPHIL # BLD: 0.18 K/UL (ref 0.05–0.5)
EOSINOPHILS RELATIVE PERCENT: 2 % (ref 0–6)
EPI CELLS #/AREA URNS HPF: ABNORMAL /HPF
ERYTHROCYTE [DISTWIDTH] IN BLOOD BY AUTOMATED COUNT: 21.7 % (ref 11.5–15)
FERRITIN SERPL-MCNC: 54 NG/ML
GFR, ESTIMATED: >90 ML/MIN/1.73M2
GLUCOSE SERPL-MCNC: 95 MG/DL (ref 74–99)
GLUCOSE UR STRIP-MCNC: NEGATIVE MG/DL
HCT VFR BLD AUTO: 25.7 % (ref 37–54)
HGB BLD-MCNC: 7 G/DL (ref 12.5–16.5)
HGB UR QL STRIP.AUTO: NEGATIVE
IMM GRANULOCYTES # BLD AUTO: 0.04 K/UL (ref 0–0.58)
IMM GRANULOCYTES NFR BLD: 1 % (ref 0–5)
IMM RETICS NFR: 4.8 % (ref 2.3–13.4)
INR PPP: 1.4
IRON SATN MFR SERPL: 4 % (ref 20–55)
IRON SERPL-MCNC: 9 UG/DL (ref 61–157)
KETONES UR STRIP-MCNC: NEGATIVE MG/DL
LEUKOCYTE ESTERASE UR QL STRIP: NEGATIVE
LYMPHOCYTES NFR BLD: 2.2 K/UL (ref 1.5–4)
LYMPHOCYTES RELATIVE PERCENT: 26 % (ref 20–42)
MCH RBC QN AUTO: 15.8 PG (ref 26–35)
MCHC RBC AUTO-ENTMCNC: 27.2 G/DL (ref 32–34.5)
MCV RBC AUTO: 57.9 FL (ref 80–99.9)
MONOCYTES NFR BLD: 0.7 K/UL (ref 0.1–0.95)
MONOCYTES NFR BLD: 8 % (ref 2–12)
MUCOUS THREADS URNS QL MICRO: PRESENT
NEUTROPHILS NFR BLD: 62 % (ref 43–80)
NEUTS SEG NFR BLD: 5.22 K/UL (ref 1.8–7.3)
NITRITE UR QL STRIP: NEGATIVE
PH UR STRIP: 6 [PH] (ref 5–8)
PLATELET # BLD AUTO: 603 K/UL (ref 130–450)
PMV BLD AUTO: 8.9 FL (ref 7–12)
POTASSIUM SERPL-SCNC: 3.8 MMOL/L (ref 3.5–5)
PROT SERPL-MCNC: 7.7 G/DL (ref 6.4–8.3)
PROT UR STRIP-MCNC: NEGATIVE MG/DL
PROTHROMBIN TIME: 15.6 SEC (ref 9.3–12.4)
RBC # BLD AUTO: 4.44 M/UL (ref 3.8–5.8)
RBC # BLD: ABNORMAL 10*6/UL
RBC #/AREA URNS HPF: ABNORMAL /HPF
RETIC HEMOGLOBIN: 15.9 PG (ref 28.2–36.6)
RETICS # AUTO: 0.04 M/UL
RETICS/RBC NFR AUTO: 0.9 % (ref 0.4–1.9)
SODIUM SERPL-SCNC: 137 MMOL/L (ref 132–146)
SP GR UR STRIP: 1.02 (ref 1–1.03)
TIBC SERPL-MCNC: 243 UG/DL (ref 250–450)
TRANSFERRIN SERPL-MCNC: 186 MG/DL (ref 200–360)
TSH SERPL DL<=0.05 MIU/L-ACNC: 1.71 UIU/ML (ref 0.27–4.2)
UROBILINOGEN UR STRIP-ACNC: 0.2 EU/DL (ref 0–1)
WBC #/AREA URNS HPF: ABNORMAL /HPF
WBC OTHER # BLD: 8.4 K/UL (ref 4.5–11.5)

## 2025-06-03 PROCEDURE — 85610 PROTHROMBIN TIME: CPT

## 2025-06-03 PROCEDURE — 82550 ASSAY OF CK (CPK): CPT

## 2025-06-03 PROCEDURE — G0480 DRUG TEST DEF 1-7 CLASSES: HCPCS

## 2025-06-03 PROCEDURE — 82746 ASSAY OF FOLIC ACID SERUM: CPT

## 2025-06-03 PROCEDURE — 84165 PROTEIN E-PHORESIS SERUM: CPT

## 2025-06-03 PROCEDURE — 83540 ASSAY OF IRON: CPT

## 2025-06-03 PROCEDURE — 86481 TB AG RESPONSE T-CELL SUSP: CPT

## 2025-06-03 PROCEDURE — 87449 NOS EACH ORGANISM AG IA: CPT

## 2025-06-03 PROCEDURE — 81001 URINALYSIS AUTO W/SCOPE: CPT

## 2025-06-03 PROCEDURE — 82728 ASSAY OF FERRITIN: CPT

## 2025-06-03 PROCEDURE — 86038 ANTINUCLEAR ANTIBODIES: CPT

## 2025-06-03 PROCEDURE — 86592 SYPHILIS TEST NON-TREP QUAL: CPT

## 2025-06-03 PROCEDURE — 36415 COLL VENOUS BLD VENIPUNCTURE: CPT

## 2025-06-03 PROCEDURE — 82607 VITAMIN B-12: CPT

## 2025-06-03 PROCEDURE — 84466 ASSAY OF TRANSFERRIN: CPT

## 2025-06-03 PROCEDURE — 85045 AUTOMATED RETICULOCYTE COUNT: CPT

## 2025-06-03 PROCEDURE — 85025 COMPLETE CBC W/AUTO DIFF WBC: CPT

## 2025-06-03 PROCEDURE — 80074 ACUTE HEPATITIS PANEL: CPT

## 2025-06-03 PROCEDURE — 80307 DRUG TEST PRSMV CHEM ANLYZR: CPT

## 2025-06-03 PROCEDURE — 86780 TREPONEMA PALLIDUM: CPT

## 2025-06-03 PROCEDURE — 86039 ANTINUCLEAR ANTIBODIES (ANA): CPT

## 2025-06-03 PROCEDURE — 80053 COMPREHEN METABOLIC PANEL: CPT

## 2025-06-03 PROCEDURE — 82248 BILIRUBIN DIRECT: CPT

## 2025-06-03 PROCEDURE — 87040 BLOOD CULTURE FOR BACTERIA: CPT

## 2025-06-03 PROCEDURE — 84155 ASSAY OF PROTEIN SERUM: CPT

## 2025-06-03 PROCEDURE — 84443 ASSAY THYROID STIM HORMONE: CPT

## 2025-06-03 PROCEDURE — 87389 HIV-1 AG W/HIV-1&-2 AB AG IA: CPT

## 2025-06-03 PROCEDURE — 87899 AGENT NOS ASSAY W/OPTIC: CPT

## 2025-06-03 NOTE — TELEPHONE ENCOUNTER
Patient called in the office inquiring about getting his antibiotic refilled.  You saw him last week for anemia and fatigue.  He got his labs drawn today.  He said he still has swelling in his hand.  I informed patient that he wasn't seen for hand swelling, so if this occurring he needs to be seen again in walk in or his PCP.  He wanted me to check with the Dr who saw him in walk in.  Please call patient and advise

## 2025-06-04 LAB
6MAM UR QL SCN: POSITIVE
ABNORMAL SPECIMEN VALIDITY TEST: ABNORMAL
AMPHET UR QL SCN: POSITIVE
ANA SER QL IA: NEGATIVE
BARBITURATES UR QL SCN: NEGATIVE
BENZODIAZ UR QL: POSITIVE
BUPRENORPHINE UR QL: NEGATIVE
CANNABINOIDS UR QL SCN: NEGATIVE
COCAINE UR QL SCN: POSITIVE
ETHANOL UR-MCNC: NOT DETECTED MG/DL
FENTANYL UR QL: POSITIVE
FOLATE SERPL-MCNC: 3.4 NG/ML (ref 4.6–34.8)
HAV IGM SERPL QL IA: NONREACTIVE
HBV CORE IGM SERPL QL IA: NONREACTIVE
HBV SURFACE AG SERPL QL IA: NONREACTIVE
HCV AB SERPL QL IA: NONREACTIVE
HIV 1+2 AB+HIV1 P24 AG SERPL QL IA: NONREACTIVE
INTEGRITY CHECK, CREATININE, URINE: 150 MG/DL (ref 22–250)
INTEGRITY CHECK, OXIDANT, URINE: 46 MG/L
INTEGRITY CHECK, PH, URINE: 5.5 (ref 4.5–8)
INTEGRITY CHECK, SPECIFIC GRAVITY, URINE: 1.02 (ref 1–1.03)
L PNEUMO1 AG UR QL IA.RAPID: NEGATIVE
METHADONE UR QL: NEGATIVE
OPIATES UR QL SCN: POSITIVE
OXYCODONE UR QL SCN: NEGATIVE
PCP UR QL SCN: NEGATIVE
RPR SER QL: NONREACTIVE
S PNEUM AG SPEC QL: NEGATIVE
SPECIMEN SOURCE: NORMAL
TEST INFORMATION: ABNORMAL
TRAMADOL, URINE: NEGATIVE
VIT B12 SERPL-MCNC: 393 PG/ML (ref 232–1245)

## 2025-06-05 LAB
6MAM UR CFM-MCNC: 188.2 NG/ML
ALBUMIN SERPL-MCNC: 3 G/DL (ref 3.5–4.7)
ALPHA1 GLOB SERPL ELPH-MCNC: 0.4 G/DL (ref 0.2–0.4)
ALPHA2 GLOB SERPL ELPH-MCNC: 0.9 G/DL (ref 0.5–1)
AMPHET UR-MCNC: 205.5 NG/ML
B-GLOBULIN SERPL ELPH-MCNC: 1 G/DL (ref 0.8–1.3)
BZE UR-MCNC: 570.9 NG/ML
CODEINE, QUANTITATIVE, URINE: 340.7 NG/ML
COMPLIANCE DRUG ANALYSIS, URINE: NORMAL
EPHEDRIN UR QL: <100 NG/ML
FENTANYL, URN, QUANT: 143 NG/ML
GAMMA GLOB SERPL ELPH-MCNC: 1.9 G/DL (ref 0.7–1.6)
HYDROCODONE, QUANTITATIVE, URINE: <50 NG/ML
HYDROMORPHONE UR QL: <50 NG/ML
MDA, QUANTITATIVE, URINE: <100 NG/ML
MDEA, QUANTITATIVE, URINE: <100 NG/ML
MDMA UR QL: <100 NG/ML
METHAMPHETAMINE QUANTITATIVE URINE: 757.8 NG/ML
MORPHINE, QUANTITATIVE, URINE: >1000 NG/ML
NORFENTANYL, URN, QUANT: 729.6 NG/ML
NORHYDROCODONE, QUANTITATIVE, URINE: <50 NG/ML
NOROXYCODONE, QUANTITATIVE, URINE: <50 NG/ML
OXYCODONE URINE, QUANTITATIVE: <50 NG/ML
OXYMORPHONE, QUANTITATIVE, URINE: <50 NG/ML
PATH REV BLD -IMP: NORMAL
PATHOLOGIST: ABNORMAL
PHENTERMINE, URINE, QUANTITATIVE: <100 NG/ML
PROT PATTERN SERPL ELPH-IMP: ABNORMAL
PROT SERPL-MCNC: 7.2 G/DL (ref 6.4–8.3)

## 2025-06-06 LAB — T SPOT TB TEST: NORMAL

## 2025-06-15 ENCOUNTER — HOSPITAL ENCOUNTER (EMERGENCY)
Age: 40
Discharge: LEFT BEFORE DISCHARGE | End: 2025-06-15

## 2025-06-15 ENCOUNTER — HOSPITAL ENCOUNTER (EMERGENCY)
Age: 40
Discharge: HOME | End: 2025-06-16
Attending: STUDENT IN AN ORGANIZED HEALTH CARE EDUCATION/TRAINING PROGRAM
Payer: COMMERCIAL

## 2025-06-15 ENCOUNTER — APPOINTMENT (OUTPATIENT)
Dept: GENERAL RADIOLOGY | Age: 40
End: 2025-06-15
Payer: COMMERCIAL

## 2025-06-15 VITALS
BODY MASS INDEX: 18.29 KG/M2 | OXYGEN SATURATION: 100 % | DIASTOLIC BLOOD PRESSURE: 68 MMHG | TEMPERATURE: 98.3 F | WEIGHT: 138 LBS | SYSTOLIC BLOOD PRESSURE: 113 MMHG | HEIGHT: 73 IN | RESPIRATION RATE: 16 BRPM | HEART RATE: 80 BPM

## 2025-06-15 DIAGNOSIS — L97.909 ULCER OF LOWER EXTREMITY, UNSPECIFIED LATERALITY, UNSPECIFIED ULCER STAGE (HCC): Primary | ICD-10-CM

## 2025-06-15 DIAGNOSIS — D64.9 ACUTE ANEMIA: ICD-10-CM

## 2025-06-15 LAB
ALBUMIN SERPL-MCNC: 3.2 G/DL (ref 3.5–5.2)
ALP SERPL-CCNC: 110 U/L (ref 40–129)
ALT SERPL-CCNC: 11 U/L (ref 0–40)
ANION GAP SERPL CALCULATED.3IONS-SCNC: 15 MMOL/L (ref 7–16)
AST SERPL-CCNC: 13 U/L (ref 0–39)
BASOPHILS # BLD: 0.08 K/UL (ref 0–0.2)
BASOPHILS NFR BLD: 1 % (ref 0–2)
BILIRUB SERPL-MCNC: 0.2 MG/DL (ref 0–1.2)
BUN SERPL-MCNC: 8 MG/DL (ref 6–20)
CALCIUM SERPL-MCNC: 8.6 MG/DL (ref 8.6–10.2)
CHLORIDE SERPL-SCNC: 102 MMOL/L (ref 98–107)
CK SERPL-CCNC: 16 U/L (ref 20–200)
CO2 SERPL-SCNC: 23 MMOL/L (ref 22–29)
CREAT SERPL-MCNC: 0.7 MG/DL (ref 0.7–1.2)
EOSINOPHIL # BLD: 0.25 K/UL (ref 0.05–0.5)
EOSINOPHILS RELATIVE PERCENT: 3 % (ref 0–6)
ERYTHROCYTE [DISTWIDTH] IN BLOOD BY AUTOMATED COUNT: 22 % (ref 11.5–15)
GFR, ESTIMATED: >90 ML/MIN/1.73M2
GLUCOSE SERPL-MCNC: 192 MG/DL (ref 74–99)
HCT VFR BLD AUTO: 24 % (ref 37–54)
HGB BLD-MCNC: 6.5 G/DL (ref 12.5–16.5)
INR PPP: 1.6
LACTATE BLDV-SCNC: 2 MMOL/L (ref 0.5–1.9)
LYMPHOCYTES NFR BLD: 1.98 K/UL (ref 1.5–4)
LYMPHOCYTES RELATIVE PERCENT: 21 % (ref 20–42)
MAGNESIUM SERPL-MCNC: 1.8 MG/DL (ref 1.6–2.6)
MCH RBC QN AUTO: 15.9 PG (ref 26–35)
MCHC RBC AUTO-ENTMCNC: 27.1 G/DL (ref 32–34.5)
MCV RBC AUTO: 58.8 FL (ref 80–99.9)
MONOCYTES NFR BLD: 0.5 K/UL (ref 0.1–0.95)
MONOCYTES NFR BLD: 5 % (ref 2–12)
NEUTROPHILS NFR BLD: 70 % (ref 43–80)
NEUTS SEG NFR BLD: 6.69 K/UL (ref 1.8–7.3)
PARTIAL THROMBOPLASTIN TIME: 33.1 SEC (ref 24.5–35.1)
PLATELET # BLD AUTO: 676 K/UL (ref 130–450)
PMV BLD AUTO: 9 FL (ref 7–12)
POTASSIUM SERPL-SCNC: 3.5 MMOL/L (ref 3.5–5)
PROT SERPL-MCNC: 7 G/DL (ref 6.4–8.3)
PROTHROMBIN TIME: 16.9 SEC (ref 9.3–12.4)
RBC # BLD AUTO: 4.08 M/UL (ref 3.8–5.8)
RBC # BLD: ABNORMAL 10*6/UL
SODIUM SERPL-SCNC: 140 MMOL/L (ref 132–146)
TROPONIN I SERPL HS-MCNC: 7 NG/L (ref 0–22)
WBC OTHER # BLD: 9.5 K/UL (ref 4.5–11.5)

## 2025-06-15 PROCEDURE — 85025 COMPLETE CBC W/AUTO DIFF WBC: CPT

## 2025-06-15 PROCEDURE — 73130 X-RAY EXAM OF HAND: CPT

## 2025-06-15 PROCEDURE — 84145 PROCALCITONIN (PCT): CPT

## 2025-06-15 PROCEDURE — 86901 BLOOD TYPING SEROLOGIC RH(D): CPT

## 2025-06-15 PROCEDURE — 83735 ASSAY OF MAGNESIUM: CPT

## 2025-06-15 PROCEDURE — 86900 BLOOD TYPING SEROLOGIC ABO: CPT

## 2025-06-15 PROCEDURE — 87040 BLOOD CULTURE FOR BACTERIA: CPT

## 2025-06-15 PROCEDURE — 86923 COMPATIBILITY TEST ELECTRIC: CPT

## 2025-06-15 PROCEDURE — 84484 ASSAY OF TROPONIN QUANT: CPT

## 2025-06-15 PROCEDURE — 85652 RBC SED RATE AUTOMATED: CPT

## 2025-06-15 PROCEDURE — 93005 ELECTROCARDIOGRAM TRACING: CPT

## 2025-06-15 PROCEDURE — 83605 ASSAY OF LACTIC ACID: CPT

## 2025-06-15 PROCEDURE — 86140 C-REACTIVE PROTEIN: CPT

## 2025-06-15 PROCEDURE — 2580000003 HC RX 258

## 2025-06-15 PROCEDURE — G0480 DRUG TEST DEF 1-7 CLASSES: HCPCS

## 2025-06-15 PROCEDURE — 85730 THROMBOPLASTIN TIME PARTIAL: CPT

## 2025-06-15 PROCEDURE — 85610 PROTHROMBIN TIME: CPT

## 2025-06-15 PROCEDURE — 80307 DRUG TEST PRSMV CHEM ANLYZR: CPT

## 2025-06-15 PROCEDURE — 80143 DRUG ASSAY ACETAMINOPHEN: CPT

## 2025-06-15 PROCEDURE — 73590 X-RAY EXAM OF LOWER LEG: CPT

## 2025-06-15 PROCEDURE — 86850 RBC ANTIBODY SCREEN: CPT

## 2025-06-15 PROCEDURE — 82550 ASSAY OF CK (CPK): CPT

## 2025-06-15 PROCEDURE — 80179 DRUG ASSAY SALICYLATE: CPT

## 2025-06-15 PROCEDURE — 99285 EMERGENCY DEPT VISIT HI MDM: CPT

## 2025-06-15 PROCEDURE — 80053 COMPREHEN METABOLIC PANEL: CPT

## 2025-06-15 PROCEDURE — 71045 X-RAY EXAM CHEST 1 VIEW: CPT

## 2025-06-15 RX ORDER — SODIUM CHLORIDE 9 MG/ML
INJECTION, SOLUTION INTRAVENOUS PRN
Status: DISCONTINUED | OUTPATIENT
Start: 2025-06-15 | End: 2025-06-16 | Stop reason: HOSPADM

## 2025-06-15 RX ORDER — 0.9 % SODIUM CHLORIDE 0.9 %
30 INTRAVENOUS SOLUTION INTRAVENOUS ONCE
Status: COMPLETED | OUTPATIENT
Start: 2025-06-15 | End: 2025-06-16

## 2025-06-15 RX ADMIN — SODIUM CHLORIDE 1878 ML: 9 INJECTION, SOLUTION INTRAVENOUS at 22:52

## 2025-06-15 ASSESSMENT — PAIN - FUNCTIONAL ASSESSMENT: PAIN_FUNCTIONAL_ASSESSMENT: NONE - DENIES PAIN

## 2025-06-15 ASSESSMENT — LIFESTYLE VARIABLES
HOW MANY STANDARD DRINKS CONTAINING ALCOHOL DO YOU HAVE ON A TYPICAL DAY: PATIENT DOES NOT DRINK
HOW OFTEN DO YOU HAVE A DRINK CONTAINING ALCOHOL: NEVER

## 2025-06-16 VITALS
HEIGHT: 73 IN | DIASTOLIC BLOOD PRESSURE: 70 MMHG | BODY MASS INDEX: 18.29 KG/M2 | TEMPERATURE: 97.4 F | RESPIRATION RATE: 20 BRPM | HEART RATE: 58 BPM | SYSTOLIC BLOOD PRESSURE: 129 MMHG | WEIGHT: 138 LBS | OXYGEN SATURATION: 96 %

## 2025-06-16 LAB
AMPHET UR QL SCN: POSITIVE
APAP SERPL-MCNC: <5 UG/ML (ref 10–30)
BARBITURATES UR QL SCN: NEGATIVE
BENZODIAZ UR QL: POSITIVE
BILIRUB UR QL STRIP: NEGATIVE
BUPRENORPHINE UR QL: NEGATIVE
CANNABINOIDS UR QL SCN: NEGATIVE
CLARITY UR: CLEAR
COCAINE UR QL SCN: POSITIVE
COLOR UR: YELLOW
COMMENT: NORMAL
CRP SERPL HS-MCNC: 51.8 MG/L (ref 0–5)
EKG ATRIAL RATE: 60 BPM
EKG P AXIS: 34 DEGREES
EKG P-R INTERVAL: 142 MS
EKG Q-T INTERVAL: 444 MS
EKG QRS DURATION: 98 MS
EKG QTC CALCULATION (BAZETT): 444 MS
EKG R AXIS: 91 DEGREES
EKG T AXIS: 77 DEGREES
EKG VENTRICULAR RATE: 60 BPM
ERYTHROCYTE [SEDIMENTATION RATE] IN BLOOD BY WESTERGREN METHOD: 56 MM/HR (ref 0–15)
ETHANOLAMINE SERPL-MCNC: <10 MG/DL (ref 0–0.08)
FENTANYL UR QL: POSITIVE
GLUCOSE UR STRIP-MCNC: NEGATIVE MG/DL
HCT VFR BLD AUTO: 24.4 % (ref 37–54)
HGB BLD-MCNC: 7.1 G/DL (ref 12.5–16.5)
HGB UR QL STRIP.AUTO: NEGATIVE
KETONES UR STRIP-MCNC: NEGATIVE MG/DL
LEUKOCYTE ESTERASE UR QL STRIP: NEGATIVE
METHADONE UR QL: NEGATIVE
NITRITE UR QL STRIP: NEGATIVE
OPIATES UR QL SCN: POSITIVE
OXYCODONE UR QL SCN: NEGATIVE
PCP UR QL SCN: NEGATIVE
PH UR STRIP: 6.5 [PH] (ref 5–8)
PROCALCITONIN SERPL-MCNC: 0.13 NG/ML (ref 0–0.08)
PROT UR STRIP-MCNC: NEGATIVE MG/DL
SALICYLATES SERPL-MCNC: <0.3 MG/DL (ref 0–30)
SP GR UR STRIP: 1.01 (ref 1–1.03)
TEST INFORMATION: ABNORMAL
TOXIC TRICYCLIC SC,BLOOD: NEGATIVE
UROBILINOGEN UR STRIP-ACNC: 0.2 EU/DL (ref 0–1)

## 2025-06-16 PROCEDURE — 6370000000 HC RX 637 (ALT 250 FOR IP)

## 2025-06-16 PROCEDURE — 80307 DRUG TEST PRSMV CHEM ANLYZR: CPT

## 2025-06-16 PROCEDURE — 93010 ELECTROCARDIOGRAM REPORT: CPT | Performed by: INTERNAL MEDICINE

## 2025-06-16 PROCEDURE — 81003 URINALYSIS AUTO W/O SCOPE: CPT

## 2025-06-16 PROCEDURE — 85018 HEMOGLOBIN: CPT

## 2025-06-16 PROCEDURE — 85014 HEMATOCRIT: CPT

## 2025-06-16 PROCEDURE — P9016 RBC LEUKOCYTES REDUCED: HCPCS

## 2025-06-16 RX ORDER — CEPHALEXIN 500 MG/1
500 CAPSULE ORAL 4 TIMES DAILY
Qty: 28 CAPSULE | Refills: 0 | Status: SHIPPED | OUTPATIENT
Start: 2025-06-16 | End: 2025-06-23

## 2025-06-16 RX ORDER — CEPHALEXIN 500 MG/1
500 CAPSULE ORAL ONCE
Status: COMPLETED | OUTPATIENT
Start: 2025-06-16 | End: 2025-06-16

## 2025-06-16 RX ORDER — DOXYCYCLINE 100 MG/1
100 CAPSULE ORAL ONCE
Status: DISCONTINUED | OUTPATIENT
Start: 2025-06-16 | End: 2025-06-16

## 2025-06-16 RX ADMIN — CEPHALEXIN 500 MG: 500 CAPSULE ORAL at 05:18

## 2025-06-16 NOTE — CONSENT
Informed Consent for Blood Component Transfusion Note    I have discussed with the patient the rationale for blood component transfusion; its benefits in treating or preventing fatigue, organ damage, or death; and its risk which includes mild transfusion reactions, rare risk of blood borne infection, or more serious but rare reactions. I have discussed the alternatives to transfusion, including the risk and consequences of not receiving transfusion. The patient had an opportunity to ask questions and had agreed to proceed with transfusion of blood components.    Electronically signed by Agustin Marie DO on 6/15/25 at 10:57 PM EDT

## 2025-06-16 NOTE — ED PROVIDER NOTES
[facility] EMERGENCY DEPARTMENT  EMERGENCY DEPARTMENT ENCOUNTER      Pt Name: Adolfo Hebert  MRN: [id number]  Birthdate 1985  Date of evaluation: 6/15/2025  Provider: Darren Dye MD  PCP: Fani Villalobos, ALEXX  Note Started: 10:00 PM EDT 6/15/25    CHIEF COMPLAINT       Chief Complaint   Patient presents with    OTHER     Hemoglobin 7 last Tuesday, on doxy and was out in the sun, hands are red and swollen       HISTORY OF PRESENT ILLNESS: 1 or more Elements   History From: Patient  Limitations to history : None    Adolfo Hebert is a 39 y.o. male who presents brought in from home with complaints of generalized fatigue as well as bilateral hand skin changes associated with concern for anemia, symptoms ongoing for several days.  Patient endorses history of anemia at baseline, follows with hematology oncology.  History of transfusions in the past.  Reports he feels like his hemoglobin is low.  Was started on doxycycline for soft tissue infection and reports he has been in the sun secondary to his job.  He has noticed a rash over the past couple of days erythematic and nontender to his bilateral hands with some swelling.  Currently denies nausea, emesis, objective fevers or chills, chest pain or pressure, shortness of breath, abdominal pain, dysuria, diarrhea, constipation.  Bilateral lower extremities with superficial ulcerations right greater than left.    Nursing Notes were all reviewed and agreed with or any disagreements were addressed in the HPI.    REVIEW OF SYSTEMS :    Positives and Pertinent negatives as per HPI.     PAST MEDICAL HISTORY/Chronic Conditions Affecting Care    has a past medical history of Heroin use.     SURGICAL HISTORY     Past Surgical History:   Procedure Laterality Date    LEG BIOPSY EXCISION N/A 9/13/2020    LEG LESION BIOPSY EXCISION performed by Lavell Dockery MD at [facility] OR       CURRENTMEDICATIONS       Discharge Medication List as of 6/16/2025

## 2025-06-17 LAB
ABO/RH: NORMAL
ANTIBODY SCREEN: NEGATIVE
ARM BAND NUMBER: NORMAL
BLOOD BANK BLOOD PRODUCT EXPIRATION DATE: NORMAL
BLOOD BANK DISPENSE STATUS: NORMAL
BLOOD BANK ISBT PRODUCT BLOOD TYPE: 5100
BLOOD BANK PRODUCT CODE: NORMAL
BLOOD BANK SAMPLE EXPIRATION: NORMAL
BLOOD BANK UNIT TYPE AND RH: NORMAL
BPU ID: NORMAL
COMPONENT: NORMAL
CROSSMATCH RESULT: NORMAL
TRANSFUSION STATUS: NORMAL
UNIT DIVISION: 0
UNIT ISSUE DATE/TIME: NORMAL

## 2025-06-23 ENCOUNTER — OFFICE VISIT (OUTPATIENT)
Dept: FAMILY MEDICINE CLINIC | Age: 40
End: 2025-06-23
Payer: COMMERCIAL

## 2025-06-23 VITALS
DIASTOLIC BLOOD PRESSURE: 48 MMHG | RESPIRATION RATE: 16 BRPM | HEIGHT: 73 IN | TEMPERATURE: 97.1 F | WEIGHT: 138 LBS | SYSTOLIC BLOOD PRESSURE: 88 MMHG | OXYGEN SATURATION: 99 % | HEART RATE: 69 BPM | BODY MASS INDEX: 18.29 KG/M2

## 2025-06-23 DIAGNOSIS — D64.9 ANEMIA, UNSPECIFIED TYPE: Primary | ICD-10-CM

## 2025-06-23 LAB — HGB, POC: 8.4 G/DL

## 2025-06-23 PROCEDURE — G8427 DOCREV CUR MEDS BY ELIG CLIN: HCPCS

## 2025-06-23 PROCEDURE — 99214 OFFICE O/P EST MOD 30 MIN: CPT

## 2025-06-23 PROCEDURE — 4004F PT TOBACCO SCREEN RCVD TLK: CPT

## 2025-06-23 PROCEDURE — G8419 CALC BMI OUT NRM PARAM NOF/U: HCPCS

## 2025-06-23 PROCEDURE — 85018 HEMOGLOBIN: CPT

## 2025-06-23 NOTE — PROGRESS NOTES
Chief Complaint   Follow-up    History of Present Illness   Source of history provided by:  patient.      Adolfo Hebert is a 39 y.o. old male presenting to the walk in clinic for evaluation of concern for anemia.  This is chronic issue for patient.  He did have blood transfusion completed on 6/19/25..  Was recently 7.1 after transfusion would like to see what his count has come up to today.  Pt denies any recent falls, syncope, CP, SOB, palpitations, HA, visual loss, unilateral weakness, fever, neck stiffness, or recent illness.      ROS    Unless otherwise stated in this report or unable to obtain because of the patient's clinical or mental status as evidenced by the medical record, this patients's positive and negative responses for Review of Systems, constitutional, psych, eyes, ENT, cardiovascular, respiratory, gastrointestinal, neurological, genitourinary, musculoskeletal, integument systems and systems related to the presenting problem are either stated in the preceding or were not pertinent or were negative for the symptoms and/or complaints related to the medical problem.    Physical Exam         VS:  BP (!) 88/48   Pulse 69   Temp 97.1 °F (36.2 °C)   Resp 16   Ht 1.854 m (6' 1\")   Wt 62.6 kg (138 lb)   SpO2 99%   BMI 18.21 kg/m²    Oxygen Saturation Interpretation: Normal.    Constitutional:  Level of Consciousness: Alert, gives appropriate history, ambulated self to the room.  Pale.  Eyes:  PERRL, EOMI, no discharge or conjunctival injection.  Ears:  External ears without lesions. TM's clear bilaterally.   Throat:  Pharynx without injection, exudate, or tonsillar hypertrophy.  Airway patient.  Neck:  Normal ROM.  Supple.  Lungs:  Clear to auscultation and breath sounds equal.  Heart:  Regular rate and rhythm, normal heart sounds, without pathological murmurs, ectopy, gallops, or rubs.  Abdomen:  Soft, nontender, good bowel sounds.  No firm or pulsatile mass.  Back:  No costovertebral

## 2025-06-25 ENCOUNTER — OFFICE VISIT (OUTPATIENT)
Dept: PRIMARY CARE CLINIC | Age: 40
End: 2025-06-25
Payer: COMMERCIAL

## 2025-06-25 VITALS
DIASTOLIC BLOOD PRESSURE: 86 MMHG | RESPIRATION RATE: 16 BRPM | SYSTOLIC BLOOD PRESSURE: 135 MMHG | HEART RATE: 100 BPM | HEIGHT: 73 IN | BODY MASS INDEX: 16.57 KG/M2 | TEMPERATURE: 98.1 F | OXYGEN SATURATION: 100 % | WEIGHT: 125 LBS

## 2025-06-25 DIAGNOSIS — F11.10 HEROIN ABUSE (HCC): ICD-10-CM

## 2025-06-25 DIAGNOSIS — L97.912 ULCER OF RIGHT LOWER EXTREMITY WITH FAT LAYER EXPOSED (HCC): ICD-10-CM

## 2025-06-25 DIAGNOSIS — D64.9 SYMPTOMATIC ANEMIA: Primary | ICD-10-CM

## 2025-06-25 DIAGNOSIS — F19.10 MULTIPLE SUBSTANCE ABUSE (HCC): ICD-10-CM

## 2025-06-25 PROCEDURE — G8419 CALC BMI OUT NRM PARAM NOF/U: HCPCS | Performed by: PHYSICIAN ASSISTANT

## 2025-06-25 PROCEDURE — G8427 DOCREV CUR MEDS BY ELIG CLIN: HCPCS | Performed by: PHYSICIAN ASSISTANT

## 2025-06-25 PROCEDURE — 4004F PT TOBACCO SCREEN RCVD TLK: CPT | Performed by: PHYSICIAN ASSISTANT

## 2025-06-25 PROCEDURE — 99214 OFFICE O/P EST MOD 30 MIN: CPT | Performed by: PHYSICIAN ASSISTANT

## 2025-06-25 RX ORDER — SULFAMETHOXAZOLE AND TRIMETHOPRIM 800; 160 MG/1; MG/1
1 TABLET ORAL 2 TIMES DAILY
Qty: 20 TABLET | Refills: 0 | Status: SHIPPED | OUTPATIENT
Start: 2025-06-25 | End: 2025-07-05

## 2025-06-25 NOTE — PROGRESS NOTES
Chief Complaint   Patient presents with    Anemia     Er follow up 6/15/25         HPI:  Adolfo presents to the office for ER follow up.  Patient was seen in the ER for ulcer of left lower.  Since being discharged from the ER Adolfo's  symptoms have been ongoing .   Any prescribed medications have not been finished.  Discharge instructions and any medication changes were again reviewed.  He has an apt with hematology. He can't tell me a date.   He did not call the wound clinic. \"I have to pay my bills.\" He was \"shooting up in my leg.\"     Patient's past medical, surgical, social and/or family history reviewed, updated in chart, and are non-contributory (unless otherwise stated).  Medications and allergies also reviewed and updated in chart.      Review of Systems:  Constitutional:  No fever, no fatigue, no chills, no headaches, no weight change  Dermatology:  No rash, no mole, no dry or sensitive skin  ENT:  No cough, no sore throat, no sinus pain, no runny nose, no ear pain  Cardiology:  No chest pain, no palpitations, no leg edema, no shortness of breath, no PND  Gastroenterology:  No dysphagia, no abdominal pain, no nausea, no vomiting, no constipation, no diarrhea, no heartburn  Musculoskeletal:  No joint pain, no leg cramps, no back pain, no muscle aches  Respiratory:  No shortness of breath, no orthopnea, no wheezing, no BARRIENTOS, no hemoptysis  Urology:  No blood in the urine, no urinary frequency, no urinary incontinence, no urinary urgency, no nocturia, no dysuria    Vitals:    06/25/25 0801   BP: 135/86   Pulse: 100   Resp: 16   Temp: 98.1 °F (36.7 °C)   SpO2: 100%   Weight: 56.7 kg (125 lb)   Height: 1.854 m (6' 0.99\")       Physical Exam  Constitutional:       General: He is not in acute distress.     Appearance: Normal appearance. He is well-developed and underweight.      Comments: Very poor hygiene    HENT:      Head: Normocephalic and atraumatic.      Right Ear: External ear normal.      Left Ear:

## 2025-07-09 NOTE — DISCHARGE INSTRUCTIONS
Visit Discharge/Physician Orders    Discharge condition: Stable    Assessment of pain at discharge: mild    Anesthetic used: 4% Lidocaine    Discharge to: Home    Left via:Private automobile    Accompanied by: accompanied by self    ECF/HHA: Referral to home care    Dressing Orders: Clean wounds right and left legs with normal saline. When available, apply Santyl, abd pads, and wrap with Coban 2 bilaterally. Change MWF, home care to change W/F.  Keep wraps clean and dry. May purchase waterproof cast cover for showering.  In clinic and until Santyl is available, apply alginate, abds, Coban 2 wraps.    Treatment Orders: Follow a nutritious diet. Choose foods high in protein: chicken, fish, and eggs. Choose foods high in Vitamin C. Multivitamin daily unless contraindicated.     Culture 7/14  Arterial studies to be scheduled    [facility] followup visit ________1 week_____________________  (Please note your next appointment above and if you are unable to keep, kindly give a 24 hour notice. Thank you.)    Physician signature:__________________________      If you experience any of the following, please call the Wound Care Center during business hours:    * Increase in Pain  * Temperature over 101  * Increase in drainage from your wound  * Drainage with a foul odor  * Bleeding  * Increase in swelling  * Need for compression bandage changes due to slippage, breakthrough drainage.    If you need medical attention outside of the business hours of the Wound Care Centers please contact your PCP or go to the nearest emergency room.

## 2025-07-14 ENCOUNTER — HOSPITAL ENCOUNTER (OUTPATIENT)
Dept: WOUND CARE | Age: 40
Discharge: HOME | End: 2025-07-14
Payer: COMMERCIAL

## 2025-07-14 ENCOUNTER — HOSPITAL ENCOUNTER (OUTPATIENT)
Age: 40
Discharge: HOME | End: 2025-07-14
Payer: COMMERCIAL

## 2025-07-14 VITALS
BODY MASS INDEX: 18.29 KG/M2 | SYSTOLIC BLOOD PRESSURE: 91 MMHG | RESPIRATION RATE: 17 BRPM | DIASTOLIC BLOOD PRESSURE: 43 MMHG | WEIGHT: 138 LBS | TEMPERATURE: 97.9 F | HEART RATE: 57 BPM | HEIGHT: 73 IN

## 2025-07-14 DIAGNOSIS — L97.822 NON-PRESSURE CHRONIC ULCER OF OTHER PART OF LEFT LOWER LEG WITH FAT LAYER EXPOSED (HCC): ICD-10-CM

## 2025-07-14 DIAGNOSIS — L03.119 CELLULITIS AND ABSCESS OF LEG: ICD-10-CM

## 2025-07-14 DIAGNOSIS — D64.9 SYMPTOMATIC ANEMIA: ICD-10-CM

## 2025-07-14 DIAGNOSIS — L97.912 ULCER OF RIGHT LOWER EXTREMITY WITH FAT LAYER EXPOSED (HCC): ICD-10-CM

## 2025-07-14 DIAGNOSIS — L97.813 NON-PRESSURE CHRONIC ULCER OF OTHER PART OF RIGHT LOWER LEG WITH NECROSIS OF MUSCLE (HCC): ICD-10-CM

## 2025-07-14 DIAGNOSIS — F11.10 HEROIN ABUSE (HCC): Primary | ICD-10-CM

## 2025-07-14 DIAGNOSIS — L02.419 CELLULITIS AND ABSCESS OF LEG: ICD-10-CM

## 2025-07-14 LAB
BASOPHILS # BLD: 0.04 K/UL (ref 0–0.2)
BASOPHILS NFR BLD: 1 % (ref 0–2)
EOSINOPHIL # BLD: 0.37 K/UL (ref 0.05–0.5)
EOSINOPHILS RELATIVE PERCENT: 5 % (ref 0–6)
ERYTHROCYTE [DISTWIDTH] IN BLOOD BY AUTOMATED COUNT: 26.7 % (ref 11.5–15)
HCT VFR BLD AUTO: 27 % (ref 37–54)
HGB BLD-MCNC: 7.4 G/DL (ref 12.5–16.5)
IMM GRANULOCYTES # BLD AUTO: 0.03 K/UL (ref 0–0.58)
IMM GRANULOCYTES NFR BLD: 0 % (ref 0–5)
LYMPHOCYTES NFR BLD: 2.05 K/UL (ref 1.5–4)
LYMPHOCYTES RELATIVE PERCENT: 25 % (ref 20–42)
MCH RBC QN AUTO: 18 PG (ref 26–35)
MCHC RBC AUTO-ENTMCNC: 27.4 G/DL (ref 32–34.5)
MCV RBC AUTO: 65.7 FL (ref 80–99.9)
MONOCYTES NFR BLD: 0.7 K/UL (ref 0.1–0.95)
MONOCYTES NFR BLD: 9 % (ref 2–12)
NEUTROPHILS NFR BLD: 61 % (ref 43–80)
NEUTS SEG NFR BLD: 5.06 K/UL (ref 1.8–7.3)
PLATELET # BLD AUTO: 567 K/UL (ref 130–450)
PMV BLD AUTO: 8.9 FL (ref 7–12)
RBC # BLD AUTO: 4.11 M/UL (ref 3.8–5.8)
RBC # BLD: ABNORMAL 10*6/UL
WBC OTHER # BLD: 8.3 K/UL (ref 4.5–11.5)

## 2025-07-14 PROCEDURE — 87205 SMEAR GRAM STAIN: CPT

## 2025-07-14 PROCEDURE — 85025 COMPLETE CBC W/AUTO DIFF WBC: CPT

## 2025-07-14 PROCEDURE — 87077 CULTURE AEROBIC IDENTIFY: CPT

## 2025-07-14 PROCEDURE — 87070 CULTURE OTHR SPECIMN AEROBIC: CPT

## 2025-07-14 PROCEDURE — 11046 DBRDMT MUSC&/FSCA EA ADDL: CPT

## 2025-07-14 PROCEDURE — 11043 DBRDMT MUSC&/FSCA 1ST 20/<: CPT

## 2025-07-14 RX ORDER — SULFAMETHOXAZOLE AND TRIMETHOPRIM 200; 40 MG/5ML; MG/5ML
160 SUSPENSION ORAL DAILY
COMMUNITY

## 2025-07-14 RX ORDER — BACITRACIN ZINC AND POLYMYXIN B SULFATE 500; 1000 [USP'U]/G; [USP'U]/G
OINTMENT TOPICAL PRN
OUTPATIENT
Start: 2025-07-14

## 2025-07-14 RX ORDER — SODIUM CHLOR/HYPOCHLOROUS ACID 0.033 %
SOLUTION, IRRIGATION IRRIGATION PRN
OUTPATIENT
Start: 2025-07-14

## 2025-07-14 RX ORDER — MUPIROCIN 2 %
OINTMENT (GRAM) TOPICAL PRN
OUTPATIENT
Start: 2025-07-14

## 2025-07-14 RX ORDER — CLOBETASOL PROPIONATE 0.5 MG/G
OINTMENT TOPICAL PRN
OUTPATIENT
Start: 2025-07-14

## 2025-07-14 RX ORDER — LIDOCAINE HYDROCHLORIDE 20 MG/ML
JELLY TOPICAL PRN
OUTPATIENT
Start: 2025-07-14

## 2025-07-14 RX ORDER — LIDOCAINE HYDROCHLORIDE 40 MG/ML
SOLUTION TOPICAL PRN
OUTPATIENT
Start: 2025-07-14

## 2025-07-14 RX ORDER — GENTAMICIN SULFATE 1 MG/G
OINTMENT TOPICAL PRN
OUTPATIENT
Start: 2025-07-14

## 2025-07-14 RX ORDER — GINSENG 100 MG
CAPSULE ORAL PRN
OUTPATIENT
Start: 2025-07-14

## 2025-07-14 RX ORDER — NEOMYCIN/BACITRACIN/POLYMYXINB 3.5-400-5K
OINTMENT (GRAM) TOPICAL PRN
OUTPATIENT
Start: 2025-07-14

## 2025-07-14 RX ORDER — TRIAMCINOLONE ACETONIDE 1 MG/G
OINTMENT TOPICAL PRN
OUTPATIENT
Start: 2025-07-14

## 2025-07-14 RX ORDER — BETAMETHASONE DIPROPIONATE 0.5 MG/G
CREAM TOPICAL PRN
OUTPATIENT
Start: 2025-07-14

## 2025-07-14 RX ORDER — LIDOCAINE HYDROCHLORIDE 40 MG/ML
SOLUTION TOPICAL ONCE
Status: COMPLETED | OUTPATIENT
Start: 2025-07-14 | End: 2025-07-14

## 2025-07-14 RX ORDER — SILVER SULFADIAZINE 10 MG/G
CREAM TOPICAL PRN
OUTPATIENT
Start: 2025-07-14

## 2025-07-14 RX ORDER — LIDOCAINE 50 MG/G
OINTMENT TOPICAL PRN
OUTPATIENT
Start: 2025-07-14

## 2025-07-14 RX ORDER — LIDOCAINE 40 MG/G
CREAM TOPICAL PRN
OUTPATIENT
Start: 2025-07-14

## 2025-07-14 RX ADMIN — LIDOCAINE HYDROCHLORIDE 15 ML: 40 SOLUTION TOPICAL at 11:05

## 2025-07-16 ENCOUNTER — OFFICE VISIT (OUTPATIENT)
Dept: PRIMARY CARE CLINIC | Age: 40
End: 2025-07-16
Payer: COMMERCIAL

## 2025-07-16 VITALS
OXYGEN SATURATION: 97 % | TEMPERATURE: 97 F | DIASTOLIC BLOOD PRESSURE: 60 MMHG | BODY MASS INDEX: 18.29 KG/M2 | WEIGHT: 138 LBS | HEIGHT: 73 IN | HEART RATE: 78 BPM | RESPIRATION RATE: 18 BRPM | SYSTOLIC BLOOD PRESSURE: 100 MMHG

## 2025-07-16 DIAGNOSIS — M54.2 NECK PAIN: ICD-10-CM

## 2025-07-16 DIAGNOSIS — M54.50 CHRONIC MIDLINE LOW BACK PAIN WITHOUT SCIATICA: ICD-10-CM

## 2025-07-16 DIAGNOSIS — L97.822 NON-PRESSURE CHRONIC ULCER OF OTHER PART OF LEFT LOWER LEG WITH FAT LAYER EXPOSED (HCC): Primary | ICD-10-CM

## 2025-07-16 DIAGNOSIS — D64.9 SYMPTOMATIC ANEMIA: ICD-10-CM

## 2025-07-16 DIAGNOSIS — G89.29 CHRONIC MIDLINE LOW BACK PAIN WITHOUT SCIATICA: ICD-10-CM

## 2025-07-16 DIAGNOSIS — L97.813 NON-PRESSURE CHRONIC ULCER OF OTHER PART OF RIGHT LOWER LEG WITH NECROSIS OF MUSCLE (HCC): ICD-10-CM

## 2025-07-16 DIAGNOSIS — F11.10 HEROIN ABUSE (HCC): ICD-10-CM

## 2025-07-16 PROCEDURE — 4004F PT TOBACCO SCREEN RCVD TLK: CPT | Performed by: PHYSICIAN ASSISTANT

## 2025-07-16 PROCEDURE — 99214 OFFICE O/P EST MOD 30 MIN: CPT | Performed by: PHYSICIAN ASSISTANT

## 2025-07-16 PROCEDURE — G8419 CALC BMI OUT NRM PARAM NOF/U: HCPCS | Performed by: PHYSICIAN ASSISTANT

## 2025-07-16 PROCEDURE — G8427 DOCREV CUR MEDS BY ELIG CLIN: HCPCS | Performed by: PHYSICIAN ASSISTANT

## 2025-07-16 RX ORDER — FERROUS SULFATE 325(65) MG
325 TABLET ORAL 2 TIMES DAILY
Qty: 90 TABLET | Refills: 1 | Status: SHIPPED | OUTPATIENT
Start: 2025-07-16

## 2025-07-16 RX ORDER — DOCUSATE SODIUM 100 MG/1
100 CAPSULE, LIQUID FILLED ORAL DAILY PRN
Qty: 30 CAPSULE | Refills: 0 | Status: SHIPPED | OUTPATIENT
Start: 2025-07-16

## 2025-07-16 NOTE — PROGRESS NOTES
Chief Complaint   Patient presents with    Wound Check     2 week follow up        HPI:  Patient is here for severe anemia. He \"slowed down\" taking the iron when his hgb was 8 bc of gi s/e. He has been taking it qd, instead of bid. He missed his hematology apt.   He is going to the wound clinic, yesterday dressings applied. He is seeing them every week. They are setting up home health in between.   The left leg is healing well. The right one \"is bad.\"  \" I feel so much better.\"   He was going to [facility] in [place] with Dr Vuong for back pain. He said to ask primary for an MRI. Xrays were done about 1 yr ago.     Patient's past medical, surgical, social and/or family history reviewed, updated in chart, and are non-contributory (unless otherwise stated).  Medications and allergies also reviewed and updated in chart.    Review of Systems:  Constitutional:  No fever, no fatigue, no chills, no headaches, no weight change  ENT:  No cough, no sore throat, no sinus pain, no runny nose, no ear pain  Cardiology:  No chest pain, no palpitations, no leg edema, no shortness of breath, no PND  Gastroenterology:  No dysphagia, no abdominal pain, no nausea, no vomiting, no constipation, no diarrhea, no heartburn  Musculoskeletal:  No joint pain, no leg cramps, + back pain, no muscle aches  Respiratory:  No shortness of breath, no orthopnea, no wheezing, no BARRIENTOS, no hemoptysis  Urology:  No blood in the urine, no urinary frequency, no urinary incontinence, no urinary urgency, no nocturia, no dysuria    Vitals:    07/16/25 0843   BP: 100/60   Pulse: 78   Resp: 18   Temp: 97 °F (36.1 °C)   TempSrc: Temporal   SpO2: 97%   Weight: 62.6 kg (138 lb)   Height: 1.854 m (6' 0.99\")       Physical Exam  Constitutional:       General: He is not in acute distress.     Appearance: Normal appearance. He is well-developed and underweight.   HENT:      Head: Normocephalic and atraumatic.      Right Ear: External ear normal.      Left

## 2025-07-16 NOTE — PROGRESS NOTES
Wound Care Request for Home Health      Home Health Company:     [employer] [phone]    Ordering Center:     [facility]  [address], [address]  [place] [address] [phone]  WOUND CARE Dept: [phone]   FAX NUMBER [phone]    Patient Information:      Adolfo Hebert  [address]  [facility] [address]   [phone]   : 1985  AGE: 40 y.o.     GENDER: male   EPISODE DATE: 2025    Insurance:      PRIMARY INSURANCE:  Plan: [employer]  Coverage: [employer] PLAN  Effective Date: 3/1/2025  Group Number: [unfilled]  Subscriber Number: [id number] - (Medicaid Managed)    Payer/Plan Subscr  Sex Relation Sub. Ins. ID Effective Group Num   1. [employer] COMMU* ADOLFO HEBERT* 1985 Male Self [id number] 3/1/25                                    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 
    Wound Care Request for Home Health      Home Health Company:     [facility] [phone]    Ordering Center:     [facility]  [address], [address]  [place] [address] [phone]  WOUND CARE Dept: [phone]   FAX NUMBER [phone]    Patient Information:      Adolfo Hebert  [address]  [facility] [address]   [phone]   : 1985  AGE: 40 y.o.     GENDER: male   EPISODE DATE: 2025    Insurance:      PRIMARY INSURANCE:  Plan: [employer]  Coverage: [employer]  Effective Date: 3/1/2025  Group Number: [unfilled]  Subscriber Number: [id number] - (Medicaid Managed)    Payer/Plan Subscr  Sex Relation Sub. Ins. ID Effective Group Num   1. [employer] COMMU* ADOLFO HEBERT* 1985 Male Self [id number] 3/1/25                                    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 
    Wound Care Supplies      Supply Company:     [facility] [address] [place] [address]  p: [phone] f: [phone]     Ordering Center:     [place] [address]  Telephone: [phone]       FAX [phone]    Patient Information:      Adolfo Hebert  [address] [place] [address]   [phone]   : 1985  AGE: 40 y.o.     GENDER: male   EPISODE DATE: 2025    Insurance:      PRIMARY INSURANCE:  Plan: [employer]  Coverage: [employer]AN  Effective Date: 3/1/2025  Group Number: [unfilled]  Subscriber Number: [id number] - (Medicaid Managed)    Payer/Plan Subscr  Sex Relation Sub. Ins. ID Effective Group Num   1. [employer] COMMU* ADOLFO HEBERT* 1985 Male Self [id number] 3/1/25                                    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 
Patient called in to the wound care clinic to states that he is draining through his dressing and was inquiring about home care visits. Spoke with Cassidy @ [facility] care whom states they are unable to accept patient as he works and not home most days for visits, patient also verbalized to home care nurse that his partner was teachable and can manage dressing changes. Explained to Cassidy the patient is ordered coban 2 wraps with must be placed per nursing and is not a teachable dressing change order due to compression. Cassidy states she will reassess and try to get the patient on the schedule for home care services, patient updated Also, educated patient if drainage is severe to go to ER.      
Patient updated on supply order and new referral for home care   
[facility] returned call that they are still unable to accept patient, referral made to [facility] and 30 day supply order placed for patient   
7/14  Arterial studies to be scheduled    [facility] followup visit ________1 week_____________________  (Please note your next appointment above and if you are unable to keep, kindly give a 24 hour notice. Thank you.)    Physician signature:__________________________      If you experience any of the following, please call the Wound Care Center during business hours:    * Increase in Pain  * Temperature over 101  * Increase in drainage from your wound  * Drainage with a foul odor  * Bleeding  * Increase in swelling  * Need for compression bandage changes due to slippage, breakthrough drainage.    If you need medical attention outside of the business hours of the Wound Care Centers please contact your PCP or go to the nearest emergency room.        Electronically signed by Sami Quintanilla DPM on 7/14/2025 at 11:33 AM

## 2025-07-17 NOTE — DISCHARGE INSTRUCTIONS
Visit Discharge/Physician Orders     Discharge condition: Stable     Assessment of pain at discharge: mild     Anesthetic used: 4% Lidocaine     Discharge to: Home     Left via:Private automobile     Accompanied by: accompanied by self     ECF/HHA: [facility]     Dressing Orders: Clean wounds right and left legs with normal saline. When available, apply Santyl, abd pads, and wrap with kerlix and coban. Change daily.  Keep wraps clean and dry. May purchase waterproof cast cover for showering.  In clinic and until Santyl is available, apply drawtex, abds, Coban 2 wraps.     Treatment Orders: Follow a nutritious diet. Choose foods high in protein: chicken, fish, and eggs. Choose foods high in Vitamin C. Multivitamin daily unless contraindicated.      7/21/25 culture reviewed - antibiotics to be called into pharmacy  Arterial studies to be scheduled     [facility] followup visit ________1 week_____________________  (Please note your next appointment above and if you are unable to keep, kindly give a 24 hour notice. Thank you.)     Physician signature:__________________________        If you experience any of the following, please call the Wound Care Center during business hours:     * Increase in Pain  * Temperature over 101  * Increase in drainage from your wound  * Drainage with a foul odor  * Bleeding  * Increase in swelling  * Need for compression bandage changes due to slippage, breakthrough drainage.     If you need medical attention outside of the business hours of the Wound Care Centers please contact your PCP or go to the nearest emergency room.

## 2025-07-20 LAB
MICROORGANISM SPEC CULT: ABNORMAL
MICROORGANISM/AGENT SPEC: ABNORMAL
SPECIMEN DESCRIPTION: ABNORMAL
SPECIMEN DESCRIPTION: ABNORMAL

## 2025-07-21 ENCOUNTER — HOSPITAL ENCOUNTER (OUTPATIENT)
Dept: WOUND CARE | Age: 40
Discharge: HOME | End: 2025-07-21
Payer: COMMERCIAL

## 2025-07-21 VITALS
DIASTOLIC BLOOD PRESSURE: 49 MMHG | HEIGHT: 72 IN | RESPIRATION RATE: 18 BRPM | BODY MASS INDEX: 18.69 KG/M2 | WEIGHT: 138 LBS | HEART RATE: 82 BPM | TEMPERATURE: 97 F | SYSTOLIC BLOOD PRESSURE: 96 MMHG

## 2025-07-21 DIAGNOSIS — L97.822 NON-PRESSURE CHRONIC ULCER OF OTHER PART OF LEFT LOWER LEG WITH FAT LAYER EXPOSED (HCC): ICD-10-CM

## 2025-07-21 DIAGNOSIS — F11.10 HEROIN ABUSE (HCC): ICD-10-CM

## 2025-07-21 DIAGNOSIS — L02.419 CELLULITIS AND ABSCESS OF LEG: ICD-10-CM

## 2025-07-21 DIAGNOSIS — L03.119 CELLULITIS AND ABSCESS OF LEG: ICD-10-CM

## 2025-07-21 DIAGNOSIS — L97.813 NON-PRESSURE CHRONIC ULCER OF OTHER PART OF RIGHT LOWER LEG WITH NECROSIS OF MUSCLE (HCC): Primary | ICD-10-CM

## 2025-07-21 PROCEDURE — 11042 DBRDMT SUBQ TIS 1ST 20SQCM/<: CPT

## 2025-07-21 PROCEDURE — 11045 DBRDMT SUBQ TISS EACH ADDL: CPT

## 2025-07-21 RX ORDER — TRIAMCINOLONE ACETONIDE 1 MG/G
OINTMENT TOPICAL PRN
OUTPATIENT
Start: 2025-07-21

## 2025-07-21 RX ORDER — SODIUM CHLOR/HYPOCHLOROUS ACID 0.033 %
SOLUTION, IRRIGATION IRRIGATION PRN
OUTPATIENT
Start: 2025-07-21

## 2025-07-21 RX ORDER — GINSENG 100 MG
CAPSULE ORAL PRN
OUTPATIENT
Start: 2025-07-21

## 2025-07-21 RX ORDER — BACITRACIN ZINC AND POLYMYXIN B SULFATE 500; 1000 [USP'U]/G; [USP'U]/G
OINTMENT TOPICAL PRN
OUTPATIENT
Start: 2025-07-21

## 2025-07-21 RX ORDER — NEOMYCIN/BACITRACIN/POLYMYXINB 3.5-400-5K
OINTMENT (GRAM) TOPICAL PRN
OUTPATIENT
Start: 2025-07-21

## 2025-07-21 RX ORDER — GENTAMICIN SULFATE 1 MG/G
OINTMENT TOPICAL PRN
OUTPATIENT
Start: 2025-07-21

## 2025-07-21 RX ORDER — LIDOCAINE HYDROCHLORIDE 40 MG/ML
SOLUTION TOPICAL PRN
Status: DISCONTINUED | OUTPATIENT
Start: 2025-07-21 | End: 2025-07-22 | Stop reason: HOSPADM

## 2025-07-21 RX ORDER — LIDOCAINE HYDROCHLORIDE 20 MG/ML
JELLY TOPICAL PRN
OUTPATIENT
Start: 2025-07-21

## 2025-07-21 RX ORDER — MUPIROCIN 2 %
OINTMENT (GRAM) TOPICAL PRN
OUTPATIENT
Start: 2025-07-21

## 2025-07-21 RX ORDER — BETAMETHASONE DIPROPIONATE 0.5 MG/G
CREAM TOPICAL PRN
OUTPATIENT
Start: 2025-07-21

## 2025-07-21 RX ORDER — SILVER SULFADIAZINE 10 MG/G
CREAM TOPICAL PRN
OUTPATIENT
Start: 2025-07-21

## 2025-07-21 RX ORDER — LIDOCAINE HYDROCHLORIDE 40 MG/ML
SOLUTION TOPICAL PRN
OUTPATIENT
Start: 2025-07-21

## 2025-07-21 RX ORDER — LIDOCAINE 40 MG/G
CREAM TOPICAL PRN
OUTPATIENT
Start: 2025-07-21

## 2025-07-21 RX ORDER — CIPROFLOXACIN 500 MG/1
500 TABLET, FILM COATED ORAL 2 TIMES DAILY
Qty: 28 TABLET | Refills: 0 | Status: SHIPPED | OUTPATIENT
Start: 2025-07-21 | End: 2025-08-04

## 2025-07-21 RX ORDER — LIDOCAINE 50 MG/G
OINTMENT TOPICAL PRN
OUTPATIENT
Start: 2025-07-21

## 2025-07-21 RX ORDER — CLOBETASOL PROPIONATE 0.5 MG/G
OINTMENT TOPICAL PRN
OUTPATIENT
Start: 2025-07-21

## 2025-07-21 RX ADMIN — LIDOCAINE HYDROCHLORIDE 20 ML: 40 SOLUTION TOPICAL at 10:35

## 2025-07-21 NOTE — PROGRESS NOTES
Wound Healing Center  History and Physical/Consultation  Podiatry    Referring Physician : Fani Villalobos PA-C  Adolfo Hebert  MEDICAL RECORD NUMBER:  [id number]  AGE: 40 y.o.   GENDER: male  : 1985  EPISODE DATE:  2025  Subjective:     Chief Complaint   Patient presents with    Wound Check     Right and Left legs         HISTORY of PRESENT ILLNESS HPI     Adolfo Hebert is a 40 y.o. male who presents today for wound/ulcer evaluation.   History of Wound Context:  The patient has had a wound of which was first noted approximately 2024.  This has been treated with oral abx and dressing changes at home. Pt is an IV drug user and states the wounds started at injection sites in lower leg. On their initial visit to the wound healing center, 2025 ,  the patient has noted that the wound has not been improving.  The patient has not had similar previous wounds in the past.  B/l tib fib x rays reviewed showing smooth periosteal reactions at both fibulas, cannot exclude early OM. Will consider MRI if no improvement in wounds. Wound cx collected of each leg. Arterial studies ordered. Informed pt due to the amount of tissue loss to his legs, especially the RLE, he remains at high risk of needing a proximal amputation. Pt expresses understanding.     Pt is on abx at time of initial visit. Advised to finish full duration of Bactrim.    25:  Wounds remain stable. Pt states a lot of drianage and sweating causing dressings to get saturated. Will go to every day changes  Still awaiting approval for santyl  Reviewed wound cx results, 14 days of oral cipro called to pts pharmacy to be taken as directed. We did discuss possible side effects of medication including tendon rupture. Pt understands risks    Wounds debrided today  Pt aware he remains at high risk of needed a proximal amputations       Wound/Ulcer Pain Timing/Severity: intermittent  Quality of pain: aching, burning  Severity:  6 / 10

## 2025-07-21 NOTE — WOUND CARE
Width (cm) 12.5 cm 07/21/25 1028   Wound Depth (cm) 2 cm 07/21/25 1028   Wound Surface Area (cm^2) 206.25 cm^2 07/21/25 1028   Change in Wound Size % (l*w) -7.31 07/21/25 1028   Wound Volume (cm^3) 412.5 cm^3 07/21/25 1028   Wound Healing % -7 07/21/25 1028   Post-Procedure Length (cm) 16.8 cm 07/21/25 1053   Post-Procedure Width (cm) 12.6 cm 07/21/25 1053   Post-Procedure Depth (cm) 2 cm 07/21/25 1053   Post-Procedure Surface Area (cm^2) 211.68 cm^2 07/21/25 1053   Post-Procedure Volume (cm^3) 423.36 cm^3 07/21/25 1053   Wound Assessment Pink/red;Slough;Devitalized tissue 07/21/25 1028   Drainage Amount Large (50-75% saturated) 07/21/25 1028   Drainage Description Serosanguinous;Green;Yellow;Brown 07/21/25 1028   Odor Mild 07/21/25 1028   Ramila-wound Assessment Fragile 07/21/25 1028   Margins Attached edges 07/14/25 1056   Wound Thickness Description not for Pressure Injury Full thickness 07/14/25 1056   Number of days: 7       Wound 07/14/25 Leg Left;Lower;Lateral #2 lateral to posterior aspect of leg (Active)   Wound Image   07/14/25 1056   Dressing Status New dressing applied 07/14/25 1147   Wound Cleansed Irrigated with saline 07/14/25 1147   Dressing/Treatment Alginate;ABD 07/14/25 1147   Wound Length (cm) 11.1 cm 07/21/25 1028   Wound Width (cm) 13 cm 07/21/25 1028   Wound Depth (cm) 0.2 cm 07/21/25 1028   Wound Surface Area (cm^2) 144.3 cm^2 07/21/25 1028   Change in Wound Size % (l*w) -37.43 07/21/25 1028   Wound Volume (cm^3) 28.86 cm^3 07/21/25 1028   Wound Healing % -37 07/21/25 1028   Post-Procedure Length (cm) 11.5 cm 07/21/25 1053   Post-Procedure Width (cm) 13.3 cm 07/21/25 1053   Post-Procedure Depth (cm) 0.2 cm 07/21/25 1053   Post-Procedure Surface Area (cm^2) 152.95 cm^2 07/21/25 1053   Post-Procedure Volume (cm^3) 30.59 cm^3 07/21/25 1053   Wound Assessment Devitalized tissue;Slough;Pink/red;Eschar moist 07/21/25 1028   Drainage Amount Large (50-75% saturated) 07/21/25 1028   Drainage Description

## 2025-07-22 NOTE — PROGRESS NOTES
Wilfredo and Junior called in and requested measurements for wounds regarding Santyl order. This nurse gave measurements from yesterdays wound visit 16.5x12.5 and 11.1x13, verified with pharmacist due to measurements from script was unreadable via fax.

## 2025-07-28 ENCOUNTER — HOSPITAL ENCOUNTER (OUTPATIENT)
Dept: GENERAL RADIOLOGY | Age: 40
Discharge: HOME | End: 2025-07-30
Payer: COMMERCIAL

## 2025-07-28 ENCOUNTER — HOSPITAL ENCOUNTER (OUTPATIENT)
Dept: WOUND CARE | Age: 40
Discharge: HOME | End: 2025-07-28
Payer: COMMERCIAL

## 2025-07-28 VITALS
RESPIRATION RATE: 16 BRPM | HEIGHT: 72 IN | DIASTOLIC BLOOD PRESSURE: 71 MMHG | TEMPERATURE: 96.1 F | SYSTOLIC BLOOD PRESSURE: 134 MMHG | HEART RATE: 99 BPM | WEIGHT: 138 LBS | BODY MASS INDEX: 18.69 KG/M2

## 2025-07-28 DIAGNOSIS — L97.822 NON-PRESSURE CHRONIC ULCER OF OTHER PART OF LEFT LOWER LEG WITH FAT LAYER EXPOSED (HCC): ICD-10-CM

## 2025-07-28 DIAGNOSIS — M54.50 CHRONIC MIDLINE LOW BACK PAIN WITHOUT SCIATICA: ICD-10-CM

## 2025-07-28 DIAGNOSIS — L97.813 NON-PRESSURE CHRONIC ULCER OF OTHER PART OF RIGHT LOWER LEG WITH NECROSIS OF MUSCLE (HCC): Primary | ICD-10-CM

## 2025-07-28 DIAGNOSIS — M54.2 NECK PAIN: ICD-10-CM

## 2025-07-28 DIAGNOSIS — L03.119 CELLULITIS AND ABSCESS OF LEG: ICD-10-CM

## 2025-07-28 DIAGNOSIS — L02.419 CELLULITIS AND ABSCESS OF LEG: ICD-10-CM

## 2025-07-28 DIAGNOSIS — G89.29 CHRONIC MIDLINE LOW BACK PAIN WITHOUT SCIATICA: ICD-10-CM

## 2025-07-28 PROCEDURE — 11045 DBRDMT SUBQ TISS EACH ADDL: CPT

## 2025-07-28 PROCEDURE — 72050 X-RAY EXAM NECK SPINE 4/5VWS: CPT

## 2025-07-28 PROCEDURE — 72110 X-RAY EXAM L-2 SPINE 4/>VWS: CPT

## 2025-07-28 PROCEDURE — 11042 DBRDMT SUBQ TIS 1ST 20SQCM/<: CPT

## 2025-07-28 RX ORDER — SODIUM CHLOR/HYPOCHLOROUS ACID 0.033 %
SOLUTION, IRRIGATION IRRIGATION PRN
OUTPATIENT
Start: 2025-07-28

## 2025-07-28 RX ORDER — LIDOCAINE 40 MG/G
CREAM TOPICAL PRN
OUTPATIENT
Start: 2025-07-28

## 2025-07-28 RX ORDER — MUPIROCIN 2 %
OINTMENT (GRAM) TOPICAL PRN
OUTPATIENT
Start: 2025-07-28

## 2025-07-28 RX ORDER — GENTAMICIN SULFATE 1 MG/G
OINTMENT TOPICAL PRN
OUTPATIENT
Start: 2025-07-28

## 2025-07-28 RX ORDER — BETAMETHASONE DIPROPIONATE 0.5 MG/G
CREAM TOPICAL PRN
OUTPATIENT
Start: 2025-07-28

## 2025-07-28 RX ORDER — LIDOCAINE HYDROCHLORIDE 40 MG/ML
SOLUTION TOPICAL PRN
Status: DISCONTINUED | OUTPATIENT
Start: 2025-07-28 | End: 2025-07-29 | Stop reason: HOSPADM

## 2025-07-28 RX ORDER — CLOBETASOL PROPIONATE 0.5 MG/G
OINTMENT TOPICAL PRN
OUTPATIENT
Start: 2025-07-28

## 2025-07-28 RX ORDER — GINSENG 100 MG
CAPSULE ORAL PRN
OUTPATIENT
Start: 2025-07-28

## 2025-07-28 RX ORDER — LIDOCAINE 50 MG/G
OINTMENT TOPICAL PRN
OUTPATIENT
Start: 2025-07-28

## 2025-07-28 RX ORDER — NEOMYCIN/BACITRACIN/POLYMYXINB 3.5-400-5K
OINTMENT (GRAM) TOPICAL PRN
OUTPATIENT
Start: 2025-07-28

## 2025-07-28 RX ORDER — TRIAMCINOLONE ACETONIDE 1 MG/G
OINTMENT TOPICAL PRN
OUTPATIENT
Start: 2025-07-28

## 2025-07-28 RX ORDER — BACITRACIN ZINC AND POLYMYXIN B SULFATE 500; 1000 [USP'U]/G; [USP'U]/G
OINTMENT TOPICAL PRN
OUTPATIENT
Start: 2025-07-28

## 2025-07-28 RX ORDER — LIDOCAINE HYDROCHLORIDE 40 MG/ML
SOLUTION TOPICAL PRN
OUTPATIENT
Start: 2025-07-28

## 2025-07-28 RX ORDER — LIDOCAINE HYDROCHLORIDE 20 MG/ML
JELLY TOPICAL PRN
OUTPATIENT
Start: 2025-07-28

## 2025-07-28 RX ORDER — SILVER SULFADIAZINE 10 MG/G
CREAM TOPICAL PRN
OUTPATIENT
Start: 2025-07-28

## 2025-07-28 RX ADMIN — LIDOCAINE HYDROCHLORIDE 50 ML: 40 SOLUTION TOPICAL at 09:33

## 2025-07-28 NOTE — PROGRESS NOTES
necrotic/eschar to stimulate bleeding to promote healing, post debridement good bleeding base and wound edges noted    Wound/Ulcer #: 1 and 2    Percent of Wound/Ulcer Debrided: 50%    Total Surface Area Debrided:  125 sq cm     Estimated Blood Loss:  Minimal  Hemostasis Achieved:  by pressure    Procedural Pain:  4  / 10  Post Procedural Pain:  7 / 10    Response to treatment:  Well tolerated by patient.     A culture was done.      Plan:     Pt is a smoker   - Discussed relationship of smoking and negative affects on wound healing   - Emphasized importance of tobacco avoidace/cessation     In my professional opinion and based off the information that is available at this time this patient has appropriate indication for HBO Therapy: Yes    Treatment Note please see attached Discharge Instructions    Written patient dismissal instructions given to patient and signed by patient or POA.         Discharge Instructions         Visit Discharge/Physician Orders     Discharge condition: Stable     Assessment of pain at discharge: mild     Anesthetic used: 4% Lidocaine     Discharge to: Home     Left via:Private automobile     Accompanied by: accompanied by self     ECF/HHA: [facility]     Dressing Orders: Clean wounds right and left legs with normal saline. When available, apply Santyl, abd pads, and wrap with kerlix and coban. Change daily.  Keep wraps clean and dry. May purchase waterproof cast cover for showering.  In clinic and until Santyl is available, apply drawtex, abds, Coban 2 wraps.     Treatment Orders: Follow a nutritious diet. Choose foods high in protein: chicken, fish, and eggs. Choose foods high in Vitamin C. Multivitamin daily unless contraindicated.      7/21/25 culture reviewed   Arterial studies scheduled: [facility] 8/12 at 8am     [facility] followup visit ________1 week_____________________  (Please note your next appointment above and if you are unable to keep, kindly give a 24 hour

## 2025-07-28 NOTE — DISCHARGE INSTRUCTIONS
Visit Discharge/Physician Orders     Discharge condition: Stable     Assessment of pain at discharge: mild     Anesthetic used: 4% Lidocaine     Discharge to: Home     Left via:Private automobile     Accompanied by: accompanied by self     [facility]: [facility]     Dressing Orders: Clean wounds right and left legs with normal saline. When available, apply Santyl, abd pads, and wrap with kerlix and coban. Change daily.  Keep wraps clean and dry. May purchase waterproof cast cover for showering.  In clinic and until Santyl is available, apply drawtex, abds, kerlix and coban     Treatment Orders: Follow a nutritious diet. Choose foods high in protein: chicken, fish, and eggs. Choose foods high in Vitamin C. Multivitamin daily unless contraindicated.      7/21/25 culture reviewed   Arterial studies scheduled: [facility] 8/12 at 8am     [facility] followup visit ________1 week_____________________  (Please note your next appointment above and if you are unable to keep, kindly give a 24 hour notice. Thank you.)     Physician signature:__________________________        If you experience any of the following, please call the Wound Care Center during business hours:     * Increase in Pain  * Temperature over 101  * Increase in drainage from your wound  * Drainage with a foul odor  * Bleeding  * Increase in swelling  * Need for compression bandage changes due to slippage, breakthrough drainage.     If you need medical attention outside of the business hours of the [facility] please contact your PCP or go to the nearest emergency room.

## 2025-08-04 ENCOUNTER — HOSPITAL ENCOUNTER (OUTPATIENT)
Dept: LAB | Age: 40
Discharge: HOME | End: 2025-08-04
Payer: COMMERCIAL

## 2025-08-04 ENCOUNTER — HOSPITAL ENCOUNTER (OUTPATIENT)
Dept: WOUND CARE | Age: 40
Discharge: HOME | End: 2025-08-04
Payer: COMMERCIAL

## 2025-08-04 VITALS
WEIGHT: 138 LBS | TEMPERATURE: 98.1 F | DIASTOLIC BLOOD PRESSURE: 75 MMHG | HEIGHT: 72 IN | BODY MASS INDEX: 18.69 KG/M2 | RESPIRATION RATE: 18 BRPM | HEART RATE: 93 BPM | SYSTOLIC BLOOD PRESSURE: 143 MMHG

## 2025-08-04 DIAGNOSIS — D64.9 SYMPTOMATIC ANEMIA: ICD-10-CM

## 2025-08-04 DIAGNOSIS — L97.822 NON-PRESSURE CHRONIC ULCER OF OTHER PART OF LEFT LOWER LEG WITH FAT LAYER EXPOSED (HCC): Primary | ICD-10-CM

## 2025-08-04 DIAGNOSIS — L97.813 NON-PRESSURE CHRONIC ULCER OF OTHER PART OF RIGHT LOWER LEG WITH NECROSIS OF MUSCLE (HCC): ICD-10-CM

## 2025-08-04 LAB
BASOPHILS # BLD: 0 K/UL (ref 0–0.2)
BASOPHILS NFR BLD: 0 % (ref 0–2)
EOSINOPHIL # BLD: 0.08 K/UL (ref 0.05–0.5)
EOSINOPHILS RELATIVE PERCENT: 1 % (ref 0–6)
ERYTHROCYTE [DISTWIDTH] IN BLOOD BY AUTOMATED COUNT: 26.1 % (ref 11.5–15)
HCT VFR BLD AUTO: 29.1 % (ref 37–54)
HGB BLD-MCNC: 8 G/DL (ref 12.5–16.5)
LYMPHOCYTES NFR BLD: 2.36 K/UL (ref 1.5–4)
LYMPHOCYTES RELATIVE PERCENT: 27 % (ref 20–42)
MCH RBC QN AUTO: 18.2 PG (ref 26–35)
MCHC RBC AUTO-ENTMCNC: 27.5 G/DL (ref 32–34.5)
MCV RBC AUTO: 66.3 FL (ref 80–99.9)
MONOCYTES NFR BLD: 0.63 K/UL (ref 0.1–0.95)
MONOCYTES NFR BLD: 7 % (ref 2–12)
NEUTROPHILS NFR BLD: 66 % (ref 43–80)
NEUTS SEG NFR BLD: 5.83 K/UL (ref 1.8–7.3)
NUCLEATED RED BLOOD CELLS: 1 PER 100 WBC
PLATELET # BLD AUTO: 659 K/UL (ref 130–450)
PMV BLD AUTO: 8.5 FL (ref 7–12)
RBC # BLD AUTO: 4.39 M/UL (ref 3.8–5.8)
RBC # BLD: ABNORMAL 10*6/UL
WBC OTHER # BLD: 8.9 K/UL (ref 4.5–11.5)

## 2025-08-04 PROCEDURE — 11046 DBRDMT MUSC&/FSCA EA ADDL: CPT

## 2025-08-04 PROCEDURE — 11043 DBRDMT MUSC&/FSCA 1ST 20/<: CPT

## 2025-08-04 PROCEDURE — 36415 COLL VENOUS BLD VENIPUNCTURE: CPT

## 2025-08-04 PROCEDURE — 85025 COMPLETE CBC W/AUTO DIFF WBC: CPT

## 2025-08-04 RX ORDER — SODIUM CHLOR/HYPOCHLOROUS ACID 0.033 %
SOLUTION, IRRIGATION IRRIGATION PRN
OUTPATIENT
Start: 2025-08-04

## 2025-08-04 RX ORDER — NEOMYCIN/BACITRACIN/POLYMYXINB 3.5-400-5K
OINTMENT (GRAM) TOPICAL PRN
OUTPATIENT
Start: 2025-08-04

## 2025-08-04 RX ORDER — TRIAMCINOLONE ACETONIDE 1 MG/G
OINTMENT TOPICAL PRN
OUTPATIENT
Start: 2025-08-04

## 2025-08-04 RX ORDER — BACITRACIN ZINC AND POLYMYXIN B SULFATE 500; 1000 [USP'U]/G; [USP'U]/G
OINTMENT TOPICAL PRN
OUTPATIENT
Start: 2025-08-04

## 2025-08-04 RX ORDER — LIDOCAINE HYDROCHLORIDE 20 MG/ML
JELLY TOPICAL PRN
OUTPATIENT
Start: 2025-08-04

## 2025-08-04 RX ORDER — LIDOCAINE 50 MG/G
OINTMENT TOPICAL PRN
OUTPATIENT
Start: 2025-08-04

## 2025-08-04 RX ORDER — MUPIROCIN 2 %
OINTMENT (GRAM) TOPICAL PRN
OUTPATIENT
Start: 2025-08-04

## 2025-08-04 RX ORDER — GINSENG 100 MG
CAPSULE ORAL PRN
OUTPATIENT
Start: 2025-08-04

## 2025-08-04 RX ORDER — SILVER SULFADIAZINE 10 MG/G
CREAM TOPICAL PRN
OUTPATIENT
Start: 2025-08-04

## 2025-08-04 RX ORDER — LIDOCAINE HYDROCHLORIDE 40 MG/ML
SOLUTION TOPICAL PRN
Status: DISCONTINUED | OUTPATIENT
Start: 2025-08-04 | End: 2025-08-05 | Stop reason: HOSPADM

## 2025-08-04 RX ORDER — LIDOCAINE HYDROCHLORIDE 40 MG/ML
SOLUTION TOPICAL PRN
OUTPATIENT
Start: 2025-08-04

## 2025-08-04 RX ORDER — GENTAMICIN SULFATE 1 MG/G
OINTMENT TOPICAL PRN
OUTPATIENT
Start: 2025-08-04

## 2025-08-04 RX ORDER — CLOBETASOL PROPIONATE 0.5 MG/G
OINTMENT TOPICAL PRN
OUTPATIENT
Start: 2025-08-04

## 2025-08-04 RX ORDER — BETAMETHASONE DIPROPIONATE 0.5 MG/G
CREAM TOPICAL PRN
OUTPATIENT
Start: 2025-08-04

## 2025-08-04 RX ORDER — LIDOCAINE 40 MG/G
CREAM TOPICAL PRN
OUTPATIENT
Start: 2025-08-04

## 2025-08-11 ENCOUNTER — HOSPITAL ENCOUNTER (OUTPATIENT)
Dept: WOUND CARE | Age: 40
Discharge: HOME | End: 2025-08-11

## 2025-08-18 ENCOUNTER — HOSPITAL ENCOUNTER (OUTPATIENT)
Dept: WOUND CARE | Age: 40
Discharge: HOME | End: 2025-08-18
Payer: COMMERCIAL

## 2025-08-18 VITALS
RESPIRATION RATE: 18 BRPM | WEIGHT: 138 LBS | HEIGHT: 72 IN | TEMPERATURE: 96.4 F | SYSTOLIC BLOOD PRESSURE: 107 MMHG | BODY MASS INDEX: 18.69 KG/M2 | DIASTOLIC BLOOD PRESSURE: 53 MMHG | HEART RATE: 66 BPM

## 2025-08-18 DIAGNOSIS — L97.813 NON-PRESSURE CHRONIC ULCER OF OTHER PART OF RIGHT LOWER LEG WITH NECROSIS OF MUSCLE (HCC): Primary | ICD-10-CM

## 2025-08-18 DIAGNOSIS — L03.119 CELLULITIS AND ABSCESS OF LEG: ICD-10-CM

## 2025-08-18 DIAGNOSIS — L97.822 NON-PRESSURE CHRONIC ULCER OF OTHER PART OF LEFT LOWER LEG WITH FAT LAYER EXPOSED (HCC): ICD-10-CM

## 2025-08-18 DIAGNOSIS — L02.419 CELLULITIS AND ABSCESS OF LEG: ICD-10-CM

## 2025-08-18 PROCEDURE — 11046 DBRDMT MUSC&/FSCA EA ADDL: CPT

## 2025-08-18 PROCEDURE — 11043 DBRDMT MUSC&/FSCA 1ST 20/<: CPT

## 2025-08-18 RX ORDER — LIDOCAINE HYDROCHLORIDE 20 MG/ML
JELLY TOPICAL PRN
OUTPATIENT
Start: 2025-08-18

## 2025-08-18 RX ORDER — SODIUM CHLOR/HYPOCHLOROUS ACID 0.033 %
SOLUTION, IRRIGATION IRRIGATION PRN
OUTPATIENT
Start: 2025-08-18

## 2025-08-18 RX ORDER — BACITRACIN ZINC AND POLYMYXIN B SULFATE 500; 1000 [USP'U]/G; [USP'U]/G
OINTMENT TOPICAL PRN
OUTPATIENT
Start: 2025-08-18

## 2025-08-18 RX ORDER — GINSENG 100 MG
CAPSULE ORAL PRN
OUTPATIENT
Start: 2025-08-18

## 2025-08-18 RX ORDER — SILVER SULFADIAZINE 10 MG/G
CREAM TOPICAL PRN
OUTPATIENT
Start: 2025-08-18

## 2025-08-18 RX ORDER — LIDOCAINE HYDROCHLORIDE 40 MG/ML
SOLUTION TOPICAL PRN
Status: DISCONTINUED | OUTPATIENT
Start: 2025-08-18 | End: 2025-08-19 | Stop reason: HOSPADM

## 2025-08-18 RX ORDER — LIDOCAINE 50 MG/G
OINTMENT TOPICAL PRN
OUTPATIENT
Start: 2025-08-18

## 2025-08-18 RX ORDER — TRIAMCINOLONE ACETONIDE 1 MG/G
OINTMENT TOPICAL PRN
OUTPATIENT
Start: 2025-08-18

## 2025-08-18 RX ORDER — BETAMETHASONE DIPROPIONATE 0.5 MG/G
CREAM TOPICAL PRN
OUTPATIENT
Start: 2025-08-18

## 2025-08-18 RX ORDER — LIDOCAINE 40 MG/G
CREAM TOPICAL PRN
OUTPATIENT
Start: 2025-08-18

## 2025-08-18 RX ORDER — MUPIROCIN 2 %
OINTMENT (GRAM) TOPICAL PRN
OUTPATIENT
Start: 2025-08-18

## 2025-08-18 RX ORDER — LIDOCAINE HYDROCHLORIDE 40 MG/ML
SOLUTION TOPICAL PRN
OUTPATIENT
Start: 2025-08-18

## 2025-08-18 RX ORDER — GENTAMICIN SULFATE 1 MG/G
OINTMENT TOPICAL PRN
OUTPATIENT
Start: 2025-08-18

## 2025-08-18 RX ORDER — NEOMYCIN/BACITRACIN/POLYMYXINB 3.5-400-5K
OINTMENT (GRAM) TOPICAL PRN
OUTPATIENT
Start: 2025-08-18

## 2025-08-18 RX ORDER — CLOBETASOL PROPIONATE 0.5 MG/G
OINTMENT TOPICAL PRN
OUTPATIENT
Start: 2025-08-18

## 2025-08-18 RX ADMIN — LIDOCAINE HYDROCHLORIDE 20 ML: 40 SOLUTION TOPICAL at 09:11

## 2025-08-18 ASSESSMENT — PAIN SCALES - GENERAL: PAINLEVEL_OUTOF10: 0

## 2025-08-25 ENCOUNTER — HOSPITAL ENCOUNTER (OUTPATIENT)
Dept: WOUND CARE | Age: 40
Discharge: HOME | End: 2025-08-25

## (undated) DEVICE — READY WET SKIN SCRUB TRAY-LF: Brand: MEDLINE INDUSTRIES, INC.

## (undated) DEVICE — SET INSTRUMENT LAP I

## (undated) DEVICE — ELECTRODE PT RET AD L9FT HI MOIST COND ADH HYDRGEL CORDED

## (undated) DEVICE — SPONGE,LAP,4"X18",XR,ST,5/PK,40PK/CS: Brand: MEDLINE INDUSTRIES, INC.

## (undated) DEVICE — DRESSING COMP W4XL4IN N ADH PD W2.5XL2.5IN GZ BORDERED ADH

## (undated) DEVICE — GRADUATE TRIANG MEASURE 1000ML BLK PRNT

## (undated) DEVICE — DRAPE,EXTREMITY,89X128,STERILE: Brand: MEDLINE

## (undated) DEVICE — 4-PORT MANIFOLD: Brand: NEPTUNE 2

## (undated) DEVICE — SPONGE GZ W4XL4IN RAYON POLY CVR W/NONWOVEN FAB STRL 2/PK

## (undated) DEVICE — SOLUTION IV IRRIG POUR BRL 0.9% SODIUM CHL 2F7124

## (undated) DEVICE — PACK PROCEDURE SURG GEN CUST

## (undated) DEVICE — FORCEPS HEMOSTAT CRV FINE TIP

## (undated) DEVICE — TOWEL,OR,DSP,ST,BLUE,STD,6/PK,12PK/CS: Brand: MEDLINE

## (undated) DEVICE — COVER HNDL LT DISP

## (undated) DEVICE — DOUBLE BASIN SET: Brand: MEDLINE INDUSTRIES, INC.

## (undated) DEVICE — GLOVE ORANGE PI 7 1/2   MSG9075

## (undated) DEVICE — BLOCK BITE 60FR RUBBER ADLT DENTAL

## (undated) DEVICE — GOWN,SIRUS,FABRNF,XL,20/CS: Brand: MEDLINE

## (undated) DEVICE — NEEDLE HYPO 25GA L1.5IN BLU POLYPR HUB S STL REG BVL STR

## (undated) DEVICE — ADHESIVE SKIN CLSR 0.7ML TOP DERMBND ADV

## (undated) DEVICE — MARKER,SKIN,WI/RULER AND LABELS: Brand: MEDLINE